# Patient Record
Sex: FEMALE | Race: WHITE | NOT HISPANIC OR LATINO | Employment: FULL TIME | URBAN - METROPOLITAN AREA
[De-identification: names, ages, dates, MRNs, and addresses within clinical notes are randomized per-mention and may not be internally consistent; named-entity substitution may affect disease eponyms.]

---

## 2019-12-03 ENCOUNTER — INITIAL PRENATAL (OUTPATIENT)
Dept: OBSTETRICS AND GYNECOLOGY | Facility: CLINIC | Age: 29
End: 2019-12-03

## 2019-12-03 VITALS
WEIGHT: 177 LBS | BODY MASS INDEX: 31.36 KG/M2 | DIASTOLIC BLOOD PRESSURE: 70 MMHG | HEIGHT: 63 IN | SYSTOLIC BLOOD PRESSURE: 110 MMHG

## 2019-12-03 DIAGNOSIS — Z34.90 EARLY STAGE OF PREGNANCY: Primary | ICD-10-CM

## 2019-12-03 PROCEDURE — 81025 URINE PREGNANCY TEST: CPT | Performed by: OBSTETRICS & GYNECOLOGY

## 2019-12-03 PROCEDURE — 0501F PRENATAL FLOW SHEET: CPT | Performed by: OBSTETRICS & GYNECOLOGY

## 2019-12-03 RX ORDER — PROMETHAZINE HYDROCHLORIDE 25 MG/1
25 TABLET ORAL EVERY 6 HOURS PRN
Qty: 30 TABLET | Refills: 0 | Status: SHIPPED | OUTPATIENT
Start: 2019-12-03 | End: 2020-01-07

## 2019-12-03 RX ORDER — FERROUS SULFATE 325(65) MG
325 TABLET ORAL
Qty: 30 TABLET | Refills: 6 | Status: SHIPPED | OUTPATIENT
Start: 2019-12-03

## 2019-12-03 NOTE — PROGRESS NOTES
CC:  Initial ob visit  Patient presents today for initial OB visit.  She states she is sure regarding her LMP.  Patient does report nausea and vomiting and states she had issues with nausea and vomiting and 2 of her previous pregnancies.  Prescription was sent for Phenergan and iron pills.  She does report a history of anemia.  She is taking a prenatal vitamin.  History was reviewed with her and she is had 4 previous term vaginal deliveries and denies any complications with those pregnancies.  She reports that her youngest has sickle cell trait and states she is unsure if she is ever been screened for sickle cell.  Initial OB counseling was done.  Discussed delivering hospital and call system.  A/P:  Supervision of early pregnancy  --OB labs ordered  --We will schedule ultrasound after 7 weeks  --Follow-up with me in 4 weeks

## 2019-12-04 LAB
ABO GROUP BLD: (no result)
BASOPHILS # BLD AUTO: 0 X10E3/UL (ref 0–0.2)
BASOPHILS NFR BLD AUTO: 1 %
BLD GP AB SCN SERPL QL: NEGATIVE
EOSINOPHIL # BLD AUTO: 0.1 X10E3/UL (ref 0–0.4)
EOSINOPHIL NFR BLD AUTO: 1 %
ERYTHROCYTE [DISTWIDTH] IN BLOOD BY AUTOMATED COUNT: 13.4 % (ref 12.3–15.4)
HBV SURFACE AG SERPL QL IA: NEGATIVE
HCT VFR BLD AUTO: 35.7 % (ref 34–46.6)
HCV AB S/CO SERPL IA: 0.1 S/CO RATIO (ref 0–0.9)
HGB A MFR BLD: 97.7 % (ref 96.4–98.8)
HGB A2 MFR BLD COLUMN CHROM: 2.3 % (ref 1.8–3.2)
HGB BLD-MCNC: 11.7 G/DL (ref 11.1–15.9)
HGB C MFR BLD: 0 %
HGB F MFR BLD: 0 % (ref 0–2)
HGB FRACT BLD-IMP: NORMAL
HGB S BLD QL SOLY: NEGATIVE
HGB S MFR BLD: 0 %
HIV 1+2 AB+HIV1 P24 AG SERPL QL IA: NON REACTIVE
IMM GRANULOCYTES # BLD AUTO: 0 X10E3/UL (ref 0–0.1)
IMM GRANULOCYTES NFR BLD AUTO: 0 %
LYMPHOCYTES # BLD AUTO: 1.8 X10E3/UL (ref 0.7–3.1)
LYMPHOCYTES NFR BLD AUTO: 21 %
MCH RBC QN AUTO: 25.7 PG (ref 26.6–33)
MCHC RBC AUTO-ENTMCNC: 32.8 G/DL (ref 31.5–35.7)
MCV RBC AUTO: 79 FL (ref 79–97)
MONOCYTES # BLD AUTO: 0.6 X10E3/UL (ref 0.1–0.9)
MONOCYTES NFR BLD AUTO: 7 %
NEUTROPHILS # BLD AUTO: 5.9 X10E3/UL (ref 1.4–7)
NEUTROPHILS NFR BLD AUTO: 70 %
PLATELET # BLD AUTO: 317 X10E3/UL (ref 150–450)
RBC # BLD AUTO: 4.55 X10E6/UL (ref 3.77–5.28)
RH BLD: POSITIVE
RPR SER QL: NON REACTIVE
RUBV IGG SERPL IA-ACNC: 8.69 INDEX
WBC # BLD AUTO: 8.4 X10E3/UL (ref 3.4–10.8)

## 2019-12-06 LAB
AMPHETAMINES UR QL SCN: NEGATIVE NG/ML
BACTERIA UR CULT: NO GROWTH
BACTERIA UR CULT: NORMAL
BARBITURATES UR QL SCN: NEGATIVE NG/ML
BENZODIAZ UR QL: NEGATIVE NG/ML
BZE UR QL: NEGATIVE NG/ML
CANNABINOIDS UR QL SCN: NEGATIVE NG/ML
METHADONE UR QL SCN: NEGATIVE NG/ML
OPIATES UR QL: NEGATIVE NG/ML
PCP UR QL: NEGATIVE NG/ML
PROPOXYPH UR QL SCN: NEGATIVE NG/ML

## 2019-12-07 LAB
C TRACH RRNA CVX QL NAA+PROBE: NEGATIVE
CONV .: NORMAL
CYTOLOGIST CVX/VAG CYTO: NORMAL
CYTOLOGY CVX/VAG DOC CYTO: NORMAL
CYTOLOGY CVX/VAG DOC THIN PREP: NORMAL
DX ICD CODE: NORMAL
HIV 1 & 2 AB SER-IMP: NORMAL
N GONORRHOEA RRNA CVX QL NAA+PROBE: NEGATIVE
OTHER STN SPEC: NORMAL
STAT OF ADQ CVX/VAG CYTO-IMP: NORMAL
T VAGINALIS RRNA SPEC QL NAA+PROBE: NEGATIVE

## 2019-12-18 ENCOUNTER — PROCEDURE VISIT (OUTPATIENT)
Dept: OBSTETRICS AND GYNECOLOGY | Facility: CLINIC | Age: 29
End: 2019-12-18

## 2019-12-18 DIAGNOSIS — O36.80X0 ENCOUNTER TO DETERMINE FETAL VIABILITY OF PREGNANCY, SINGLE OR UNSPECIFIED FETUS: Primary | ICD-10-CM

## 2019-12-18 PROCEDURE — 76817 TRANSVAGINAL US OBSTETRIC: CPT | Performed by: OBSTETRICS & GYNECOLOGY

## 2020-01-07 ENCOUNTER — ROUTINE PRENATAL (OUTPATIENT)
Dept: OBSTETRICS AND GYNECOLOGY | Facility: CLINIC | Age: 30
End: 2020-01-07

## 2020-01-07 VITALS — BODY MASS INDEX: 31 KG/M2 | WEIGHT: 175 LBS | DIASTOLIC BLOOD PRESSURE: 81 MMHG | SYSTOLIC BLOOD PRESSURE: 122 MMHG

## 2020-01-07 DIAGNOSIS — Z34.81 ENCOUNTER FOR SUPERVISION OF OTHER NORMAL PREGNANCY IN FIRST TRIMESTER: Primary | ICD-10-CM

## 2020-01-07 LAB
GLUCOSE UR STRIP-MCNC: NEGATIVE MG/DL
PROT UR STRIP-MCNC: ABNORMAL MG/DL

## 2020-01-07 PROCEDURE — 0502F SUBSEQUENT PRENATAL CARE: CPT | Performed by: OBSTETRICS & GYNECOLOGY

## 2020-01-07 RX ORDER — ONDANSETRON 4 MG/1
4 TABLET, FILM COATED ORAL EVERY 6 HOURS PRN
Qty: 30 TABLET | Refills: 1 | Status: SHIPPED | OUTPATIENT
Start: 2020-01-07 | End: 2020-07-16 | Stop reason: HOSPADM

## 2020-01-07 NOTE — PROGRESS NOTES
CC:   Pregnancy  Patient reports continued nausea and does not like how the Phenergan makes her feel.  Discussed trying over-the-counter Unisom with B6.  A prescription for Zofran was also sent for her to try.  Her weight has remained stable.  Initial OB labs were reviewed with the patient along with change in RICHI based on her ultrasound.  A/P:  Supervision of normal pregnancy at 11 weeks with nausea and vomiting  --Prescription was sent for Zofran and also discussed that she may try Unisom and vitamin B6  --Follow-up in 4 weeks

## 2020-02-10 LAB
B-HCG UR QL: POSITIVE
INTERNAL NEGATIVE CONTROL: NEGATIVE
INTERNAL POSITIVE CONTROL: POSITIVE
Lab: ABNORMAL

## 2020-02-14 ENCOUNTER — ROUTINE PRENATAL (OUTPATIENT)
Dept: OBSTETRICS AND GYNECOLOGY | Facility: CLINIC | Age: 30
End: 2020-02-14

## 2020-02-14 VITALS — SYSTOLIC BLOOD PRESSURE: 122 MMHG | DIASTOLIC BLOOD PRESSURE: 83 MMHG | WEIGHT: 182 LBS | BODY MASS INDEX: 32.24 KG/M2

## 2020-02-14 DIAGNOSIS — Z34.82 ENCOUNTER FOR SUPERVISION OF OTHER NORMAL PREGNANCY IN SECOND TRIMESTER: Primary | ICD-10-CM

## 2020-02-14 DIAGNOSIS — Z3A.16 16 WEEKS GESTATION OF PREGNANCY: ICD-10-CM

## 2020-02-14 LAB
GLUCOSE UR STRIP-MCNC: NEGATIVE MG/DL
PROT UR STRIP-MCNC: ABNORMAL MG/DL

## 2020-02-14 PROCEDURE — 0502F SUBSEQUENT PRENATAL CARE: CPT | Performed by: OBSTETRICS & GYNECOLOGY

## 2020-02-14 NOTE — PROGRESS NOTES
CC:  Pregnancy  Patient has no major complaints.  She states nausea and vomiting have resolved.  She is starting to feel some flutters.  Discussed second trimester genetic screening and she desires.  Discussed anatomy ultrasound in 3 weeks.  A/P: Supervision of pregnancy at 16 weeks  --Second trimester genetic screening ordered  --Follow-up in 3 weeks with anatomy ultrasound

## 2020-02-17 LAB
2ND TRIMESTER 4 SCREEN SERPL-IMP: NORMAL
2ND TRIMESTER 4 SCREEN SERPL-IMP: NORMAL
AFP ADJ MOM SERPL: 0.97
AFP SERPL-MCNC: 31.2 NG/ML
AGE AT DELIVERY: 30.1 YR
FET TS 18 RISK FROM MAT AGE: NORMAL
FET TS 21 RISK FROM MAT AGE: 690
GA METHOD: NORMAL
GA: 16.7 WEEKS
HCG ADJ MOM SERPL: 1.23
HCG SERPL-ACNC: NORMAL MIU/ML
IDDM PATIENT QL: NO
INHIBIN A ADJ MOM SERPL: 0.82
INHIBIN A SERPL-MCNC: 120.22 PG/ML
LABORATORY COMMENT REPORT: NORMAL
MULTIPLE PREGNANCY: NO
NEURAL TUBE DEFECT RISK FETUS: NORMAL %
RESULT: NORMAL
TS 18 RISK FETUS: NORMAL
TS 21 RISK FETUS: NORMAL
U ESTRIOL ADJ MOM SERPL: 1.53
U ESTRIOL SERPL-MCNC: 1.5 NG/ML

## 2020-02-18 ENCOUNTER — TELEPHONE (OUTPATIENT)
Dept: OBSTETRICS AND GYNECOLOGY | Facility: CLINIC | Age: 30
End: 2020-02-18

## 2020-02-18 NOTE — TELEPHONE ENCOUNTER
----- Message from Penelope Pate MD sent at 2/18/2020  7:39 AM EST -----  Please let patient know that her genetic screen was normal

## 2020-03-06 ENCOUNTER — ROUTINE PRENATAL (OUTPATIENT)
Dept: OBSTETRICS AND GYNECOLOGY | Facility: CLINIC | Age: 30
End: 2020-03-06

## 2020-03-06 ENCOUNTER — PROCEDURE VISIT (OUTPATIENT)
Dept: OBSTETRICS AND GYNECOLOGY | Facility: CLINIC | Age: 30
End: 2020-03-06

## 2020-03-06 VITALS — BODY MASS INDEX: 33.3 KG/M2 | DIASTOLIC BLOOD PRESSURE: 77 MMHG | WEIGHT: 188 LBS | SYSTOLIC BLOOD PRESSURE: 134 MMHG

## 2020-03-06 DIAGNOSIS — Z34.82 ENCOUNTER FOR SUPERVISION OF OTHER NORMAL PREGNANCY IN SECOND TRIMESTER: Primary | ICD-10-CM

## 2020-03-06 DIAGNOSIS — Z36.89 ENCOUNTER FOR FETAL ANATOMIC SURVEY: Primary | ICD-10-CM

## 2020-03-06 LAB
GLUCOSE UR STRIP-MCNC: NEGATIVE MG/DL
PROT UR STRIP-MCNC: ABNORMAL MG/DL

## 2020-03-06 PROCEDURE — 76805 OB US >/= 14 WKS SNGL FETUS: CPT | Performed by: OBSTETRICS & GYNECOLOGY

## 2020-03-06 PROCEDURE — 0502F SUBSEQUENT PRENATAL CARE: CPT | Performed by: OBSTETRICS & GYNECOLOGY

## 2020-03-06 NOTE — PROGRESS NOTES
CC:  Pregnancy  Patient has no major complaints.  She has been doing well.  Anatomy ultrasound was performed today and appears normal.  Ultrasound was reviewed with her.  A/P:  Supervision of pregnancy at 19 weeks  --Followup in 4 weeks

## 2020-03-16 ENCOUNTER — TELEPHONE (OUTPATIENT)
Dept: OBSTETRICS AND GYNECOLOGY | Facility: CLINIC | Age: 30
End: 2020-03-16

## 2020-03-16 NOTE — TELEPHONE ENCOUNTER
She should try her best to keep as much distance as able from the riders.  She should continue to wash her hands very frequently.  At this time, pregnant women are not thought to be at higher risk in regards to coronavirus.

## 2020-03-16 NOTE — TELEPHONE ENCOUNTER
Patient called and stated she is a [employer]  and is concerned because public transportation is not being shut down and she is pregnant. She doesn't know what she should do to best protect her self besides washing her hands.

## 2020-03-27 ENCOUNTER — TELEPHONE (OUTPATIENT)
Dept: OBSTETRICS AND GYNECOLOGY | Facility: CLINIC | Age: 30
End: 2020-03-27

## 2020-03-27 NOTE — TELEPHONE ENCOUNTER
I have sent in a prescription for terconazole to treat yeast infection.  If symptoms do not improve, she would need to be seen.

## 2020-03-27 NOTE — TELEPHONE ENCOUNTER
Good Morning,    Patient called and stated she doesn't know if she starting to get a yeast infection but states she is extremely swollen and it is painful. She doesn't have any discharge, doesn't hurt when she urinates. States the pain and swelling started on 3/25/2020.      # [phone]

## 2020-04-10 ENCOUNTER — ROUTINE PRENATAL (OUTPATIENT)
Dept: OBSTETRICS AND GYNECOLOGY | Facility: CLINIC | Age: 30
End: 2020-04-10

## 2020-04-10 VITALS — WEIGHT: 189 LBS | SYSTOLIC BLOOD PRESSURE: 123 MMHG | BODY MASS INDEX: 33.48 KG/M2 | DIASTOLIC BLOOD PRESSURE: 79 MMHG

## 2020-04-10 DIAGNOSIS — Z34.82 ENCOUNTER FOR SUPERVISION OF OTHER NORMAL PREGNANCY IN SECOND TRIMESTER: Primary | ICD-10-CM

## 2020-04-10 LAB
GLUCOSE UR STRIP-MCNC: NEGATIVE MG/DL
PROT UR STRIP-MCNC: NEGATIVE MG/DL

## 2020-04-10 PROCEDURE — 0502F SUBSEQUENT PRENATAL CARE: CPT | Performed by: OBSTETRICS & GYNECOLOGY

## 2020-04-10 NOTE — PROGRESS NOTES
CC:  Pregnancy  Patient has no complaints.  She reports lots of fetal movement.  Patient is currently laid off work and is at home.  Discussed 1 hour glucose test at her next visit and instructions given.  A/P: Supervision of pregnancy at 24 weeks  --Follow-up in 4 weeks

## 2020-04-13 ENCOUNTER — TELEPHONE (OUTPATIENT)
Dept: OBSTETRICS AND GYNECOLOGY | Facility: CLINIC | Age: 30
End: 2020-04-13

## 2020-04-13 NOTE — TELEPHONE ENCOUNTER
Patient has been experiencing vaginal irritation for a few weeks now. She was taking the medication perscribed but didn't like the way she was reacting to it so she stopped. Patients vaginal area is still very irritated. Should I schedule her to be seen?

## 2020-04-15 ENCOUNTER — ROUTINE PRENATAL (OUTPATIENT)
Dept: OBSTETRICS AND GYNECOLOGY | Facility: CLINIC | Age: 30
End: 2020-04-15

## 2020-04-15 VITALS — DIASTOLIC BLOOD PRESSURE: 87 MMHG | BODY MASS INDEX: 32.95 KG/M2 | SYSTOLIC BLOOD PRESSURE: 129 MMHG | WEIGHT: 186 LBS

## 2020-04-15 DIAGNOSIS — N89.8 VAGINAL DISCHARGE DURING PREGNANCY IN SECOND TRIMESTER: Primary | ICD-10-CM

## 2020-04-15 DIAGNOSIS — Z3A.25 25 WEEKS GESTATION OF PREGNANCY: ICD-10-CM

## 2020-04-15 DIAGNOSIS — O26.892 VAGINAL DISCHARGE DURING PREGNANCY IN SECOND TRIMESTER: Primary | ICD-10-CM

## 2020-04-15 LAB
GLUCOSE UR STRIP-MCNC: NEGATIVE MG/DL
PROT UR STRIP-MCNC: ABNORMAL MG/DL

## 2020-04-15 PROCEDURE — 0502F SUBSEQUENT PRENATAL CARE: CPT | Performed by: OBSTETRICS & GYNECOLOGY

## 2020-04-15 NOTE — PROGRESS NOTES
Subjective   Ada Anderson is a 29 y.o. female CC:  Vaginal discharge    History of Present Illness  Patient is a 29-year-old -0-0-3 who is 25 weeks 3 days who complains of vaginal discharge with associated irritation that has been ongoing for 2 weeks.  She did try terconazole and states that symptoms worsened.  She denies any new soaps or detergents.  She denies any foul odor to the discharge.  She denies any concern for STDs.  She reports good fetal movement and has no pregnancy related complaints.    The following portions of the patient's history were reviewed and updated as appropriate: allergies, current medications, past family history, past medical history, past social history, past surgical history and problem list.    Review of Systems  Review of Systems - Negative except vaginal discharge and irritation    Objective   Physical Exam  Physical Examination: General appearance - alert, well appearing, and in no distress  Abdomen - gravid, nontender, FHT 140s  Pelvic - VULVA: normal appearing vulva with no masses, tenderness or lesions, VAGINA: vaginal discharge - white, copious, creamy and odorless, CERVIX: normal appearing cervix without discharge or lesions  Extremities - peripheral pulses normal, no pedal edema, no clubbing or cyanosis      Assessment/Plan   Diagnoses and all orders for this visit:    Vaginal discharge during pregnancy in second trimester  -     NuSwab VG+ - Swab, Vagina    25 weeks gestation of pregnancy    Vaginal culture was obtained.  We will await results of culture and treat based on results.  Patient has her next prenatal appointment scheduled.

## 2020-04-19 LAB
A VAGINAE DNA VAG QL NAA+PROBE: ABNORMAL SCORE
BVAB2 DNA VAG QL NAA+PROBE: ABNORMAL SCORE
C ALBICANS DNA VAG QL NAA+PROBE: POSITIVE
C GLABRATA DNA VAG QL NAA+PROBE: NEGATIVE
C TRACH DNA VAG QL NAA+PROBE: NEGATIVE
MEGA1 DNA VAG QL NAA+PROBE: ABNORMAL SCORE
N GONORRHOEA DNA VAG QL NAA+PROBE: NEGATIVE
T VAGINALIS DNA VAG QL NAA+PROBE: NEGATIVE

## 2020-04-20 ENCOUNTER — TELEPHONE (OUTPATIENT)
Dept: OBSTETRICS AND GYNECOLOGY | Facility: CLINIC | Age: 30
End: 2020-04-20

## 2020-04-20 RX ORDER — FLUCONAZOLE 150 MG/1
150 TABLET ORAL DAILY
Qty: 2 TABLET | Refills: 0 | Status: SHIPPED | OUTPATIENT
Start: 2020-04-20 | End: 2020-05-08

## 2020-04-20 NOTE — TELEPHONE ENCOUNTER
Patient would like the results of her labs and would also like to know if an RX will be send to her pharmacy for the infection

## 2020-04-20 NOTE — TELEPHONE ENCOUNTER
Please let patient know that her culture was positive for yeast only.  I have sent a prescription for Diflucan.  It is a one-time tablet.  If symptoms are not completely resolved in 72 hours, she can take a second dose.

## 2020-05-08 ENCOUNTER — ROUTINE PRENATAL (OUTPATIENT)
Dept: OBSTETRICS AND GYNECOLOGY | Facility: CLINIC | Age: 30
End: 2020-05-08

## 2020-05-08 VITALS — WEIGHT: 190.2 LBS | SYSTOLIC BLOOD PRESSURE: 111 MMHG | DIASTOLIC BLOOD PRESSURE: 79 MMHG | BODY MASS INDEX: 33.69 KG/M2

## 2020-05-08 DIAGNOSIS — Z34.83 ENCOUNTER FOR SUPERVISION OF OTHER NORMAL PREGNANCY IN THIRD TRIMESTER: Primary | ICD-10-CM

## 2020-05-08 LAB
GLUCOSE UR STRIP-MCNC: NEGATIVE MG/DL
PROT UR STRIP-MCNC: ABNORMAL MG/DL

## 2020-05-08 PROCEDURE — 0502F SUBSEQUENT PRENATAL CARE: CPT | Performed by: OBSTETRICS & GYNECOLOGY

## 2020-05-08 NOTE — PROGRESS NOTES
Chief Complaint   Patient presents with   • Routine Prenatal Visit     HPI- Pt is 29 y.o.  at 28w5d here for prenatal visit.  Patient has no major complaints.  She reports adequate fetal movement.    ROS-     - No vaginal bleeding    GI- No abdominal pain    /79   Wt 86.3 kg (190 lb 3.2 oz)   LMP 10/26/2019   BMI 33.69 kg/m²   Exam - See flow sheet    Fetal heart rate is normal    Assessment-  Diagnoses and all orders for this visit:    Encounter for supervision of other normal pregnancy in third trimester    Other orders  -     POC Urinalysis Dipstick      Patient is doing her 1 hour glucose test and CBC today.  Discussed fetal kick counts in the last trimester and maternal wellbeing.  She will follow-up in 2 weeks.

## 2020-05-09 LAB
ERYTHROCYTE [DISTWIDTH] IN BLOOD BY AUTOMATED COUNT: 13.7 % (ref 12.3–15.4)
GLUCOSE 1H P 50 G GLC PO SERPL-MCNC: 173 MG/DL (ref 65–179)
HCT VFR BLD AUTO: 32.1 % (ref 34–46.6)
HGB BLD-MCNC: 10.3 G/DL (ref 12–15.9)
MCH RBC QN AUTO: 25.7 PG (ref 26.6–33)
MCHC RBC AUTO-ENTMCNC: 32.1 G/DL (ref 31.5–35.7)
MCV RBC AUTO: 80 FL (ref 79–97)
PLATELET # BLD AUTO: 196 10*3/MM3 (ref 140–450)
RBC # BLD AUTO: 4.01 10*6/MM3 (ref 3.77–5.28)
WBC # BLD AUTO: 7.89 10*3/MM3 (ref 3.4–10.8)

## 2020-05-11 ENCOUNTER — TELEPHONE (OUTPATIENT)
Dept: OBSTETRICS AND GYNECOLOGY | Facility: CLINIC | Age: 30
End: 2020-05-11

## 2020-05-11 DIAGNOSIS — R73.09 IMPAIRED GLUCOSE TOLERANCE TEST: Primary | ICD-10-CM

## 2020-05-11 PROBLEM — O99.019 MATERNAL ANEMIA IN PREGNANCY, ANTEPARTUM: Status: ACTIVE | Noted: 2020-05-11

## 2020-05-11 NOTE — TELEPHONE ENCOUNTER
----- Message from Penelope Pate MD sent at 5/11/2020  9:00 AM EDT -----  Please let patient know that she failed her 1 hour glucose test and needs to return to do her 3-hour test.  Her CBC also indicates she is anemic.  Her chart indicates that she is taking iron.  Can you please make sure that she is taking it?

## 2020-05-11 NOTE — TELEPHONE ENCOUNTER
Pt aware of failed one hour and will return for 3 hour.  Advised patient to take her iron as directed due to anemia shown on labs.

## 2020-05-22 ENCOUNTER — ROUTINE PRENATAL (OUTPATIENT)
Dept: OBSTETRICS AND GYNECOLOGY | Facility: CLINIC | Age: 30
End: 2020-05-22

## 2020-05-22 VITALS — SYSTOLIC BLOOD PRESSURE: 124 MMHG | WEIGHT: 193 LBS | BODY MASS INDEX: 34.19 KG/M2 | DIASTOLIC BLOOD PRESSURE: 81 MMHG

## 2020-05-22 DIAGNOSIS — Z34.83 ENCOUNTER FOR SUPERVISION OF OTHER NORMAL PREGNANCY IN THIRD TRIMESTER: Primary | ICD-10-CM

## 2020-05-22 LAB
GLUCOSE UR STRIP-MCNC: NEGATIVE MG/DL
PROT UR STRIP-MCNC: ABNORMAL MG/DL

## 2020-05-22 PROCEDURE — 0502F SUBSEQUENT PRENATAL CARE: CPT | Performed by: OBSTETRICS & GYNECOLOGY

## 2020-05-22 NOTE — PROGRESS NOTES
Chief Complaint   Patient presents with   • Routine Prenatal Visit     HPI- Pt is 30 y.o.  at 30w5d here for prenatal visit.  Patient overall feels well.  She complains of some mild swelling.  She reports adequate fetal movement.    ROS-     - No vaginal bleeding    GI- No abdominal pain    /81   Wt 87.5 kg (193 lb)   LMP 10/26/2019   BMI 34.19 kg/m²   Exam - See flow sheet    Fetal heart rate is normal    Assessment-  Diagnoses and all orders for this visit:    Encounter for supervision of other normal pregnancy in third trimester    Other orders  -     POC Urinalysis Dipstick    Patient is doing her 3-hour glucose test today.  She will follow-up in 3 weeks with growth ultrasound due to maternal anemia.

## 2020-05-23 LAB
GLUCOSE 1H P 100 G GLC PO SERPL-MCNC: 172 MG/DL (ref 65–179)
GLUCOSE 2H P 100 G GLC PO SERPL-MCNC: 157 MG/DL (ref 65–154)
GLUCOSE 3H P 100 G GLC PO SERPL-MCNC: 125 MG/DL (ref 65–139)
GLUCOSE P FAST SERPL-MCNC: 93 MG/DL (ref 65–94)

## 2020-05-27 ENCOUNTER — TELEPHONE (OUTPATIENT)
Dept: OBSTETRICS AND GYNECOLOGY | Facility: CLINIC | Age: 30
End: 2020-05-27

## 2020-05-27 NOTE — TELEPHONE ENCOUNTER
Ada called this morning and stated that she is feeling a lot of pressure in her vaginal area and her bottom.      Are there any home remedies she can try?

## 2020-06-10 ENCOUNTER — PROCEDURE VISIT (OUTPATIENT)
Dept: OBSTETRICS AND GYNECOLOGY | Facility: CLINIC | Age: 30
End: 2020-06-10

## 2020-06-10 ENCOUNTER — ROUTINE PRENATAL (OUTPATIENT)
Dept: OBSTETRICS AND GYNECOLOGY | Facility: CLINIC | Age: 30
End: 2020-06-10

## 2020-06-10 VITALS — SYSTOLIC BLOOD PRESSURE: 130 MMHG | WEIGHT: 199 LBS | BODY MASS INDEX: 35.25 KG/M2 | DIASTOLIC BLOOD PRESSURE: 83 MMHG

## 2020-06-10 DIAGNOSIS — Z36.89 ENCOUNTER FOR ULTRASOUND TO CHECK FETAL GROWTH: Primary | ICD-10-CM

## 2020-06-10 DIAGNOSIS — Z34.83 ENCOUNTER FOR SUPERVISION OF OTHER NORMAL PREGNANCY IN THIRD TRIMESTER: Primary | ICD-10-CM

## 2020-06-10 DIAGNOSIS — O99.019 MATERNAL ANEMIA IN PREGNANCY, ANTEPARTUM: ICD-10-CM

## 2020-06-10 LAB
GLUCOSE UR STRIP-MCNC: NEGATIVE MG/DL
PROT UR STRIP-MCNC: ABNORMAL MG/DL

## 2020-06-10 PROCEDURE — 76816 OB US FOLLOW-UP PER FETUS: CPT | Performed by: OBSTETRICS & GYNECOLOGY

## 2020-06-10 PROCEDURE — 0502F SUBSEQUENT PRENATAL CARE: CPT | Performed by: OBSTETRICS & GYNECOLOGY

## 2020-06-10 NOTE — PROGRESS NOTES
No chief complaint on file.    HPI- Pt is 30 y.o.  at 33w3d here for prenatal visit.  Patient reports intense pelvic pressure and lower back pain.  She is requesting to be checked.  She denies any painful contractions, leaking, or bleeding.  She reports adequate fetal movement.    ROS-     - No vaginal bleeding    GI- No abdominal pain    /83   Wt 90.3 kg (199 lb)   LMP 10/26/2019   BMI 35.25 kg/m²   Exam - See flow sheet    Fetal heart rate is normal    Assessment-  Diagnoses and all orders for this visit:    Encounter for supervision of other normal pregnancy in third trimester    Other orders  -     POC Urinalysis Dipstick    Patient cervix was closed on exam.   labor precautions were reviewed.  Patient is using a belly support band.  Growth ultrasound was performed today and fetus shows appropriate fetal growth.  Ultrasound was reviewed with her.  She will follow-up in 2 weeks.

## 2020-06-24 ENCOUNTER — ROUTINE PRENATAL (OUTPATIENT)
Dept: OBSTETRICS AND GYNECOLOGY | Facility: CLINIC | Age: 30
End: 2020-06-24

## 2020-06-24 VITALS — SYSTOLIC BLOOD PRESSURE: 126 MMHG | WEIGHT: 201 LBS | DIASTOLIC BLOOD PRESSURE: 84 MMHG | BODY MASS INDEX: 35.61 KG/M2

## 2020-06-24 DIAGNOSIS — Z34.83 ENCOUNTER FOR SUPERVISION OF OTHER NORMAL PREGNANCY IN THIRD TRIMESTER: Primary | ICD-10-CM

## 2020-06-24 LAB
GLUCOSE UR STRIP-MCNC: NEGATIVE MG/DL
PROT UR STRIP-MCNC: ABNORMAL MG/DL

## 2020-06-24 PROCEDURE — 0502F SUBSEQUENT PRENATAL CARE: CPT | Performed by: OBSTETRICS & GYNECOLOGY

## 2020-06-24 NOTE — PROGRESS NOTES
Chief Complaint   Patient presents with   • Routine Prenatal Visit     HPI- Pt is 30 y.o.  at 35w3d here for prenatal visit.  She has no complaints today.  She reports adequate fetal movement.    ROS-     - No vaginal bleeding    GI- No abdominal pain    /84   Wt 91.2 kg (201 lb)   LMP 10/26/2019   BMI 35.61 kg/m²   Exam - See flow sheet    Fetal heart rate is normal    Assessment-  Diagnoses and all orders for this visit:    Encounter for supervision of other normal pregnancy in third trimester  -     Group B Streptococcus Culture - Swab, Vaginal/Rectum    Other orders  -     POC Urinalysis Dipstick    GBS culture was obtained today.  Labor precautions were reviewed with the patient and discussed when to go to labor and delivery.  She will follow-up in 1 week.

## 2020-06-28 PROBLEM — B95.1 POSITIVE GBS TEST: Status: ACTIVE | Noted: 2020-06-28

## 2020-06-28 LAB — B-HEM STREP SPEC QL CULT: POSITIVE

## 2020-07-01 ENCOUNTER — ROUTINE PRENATAL (OUTPATIENT)
Dept: OBSTETRICS AND GYNECOLOGY | Facility: CLINIC | Age: 30
End: 2020-07-01

## 2020-07-01 VITALS — SYSTOLIC BLOOD PRESSURE: 131 MMHG | WEIGHT: 205 LBS | DIASTOLIC BLOOD PRESSURE: 90 MMHG | BODY MASS INDEX: 36.31 KG/M2

## 2020-07-01 DIAGNOSIS — Z3A.36 36 WEEKS GESTATION OF PREGNANCY: ICD-10-CM

## 2020-07-01 DIAGNOSIS — O16.3 ELEVATED BLOOD PRESSURE AFFECTING PREGNANCY IN THIRD TRIMESTER, ANTEPARTUM: Primary | ICD-10-CM

## 2020-07-01 LAB
GLUCOSE UR STRIP-MCNC: NEGATIVE MG/DL
PROT UR STRIP-MCNC: ABNORMAL MG/DL

## 2020-07-01 PROCEDURE — 0502F SUBSEQUENT PRENATAL CARE: CPT | Performed by: OBSTETRICS & GYNECOLOGY

## 2020-07-01 NOTE — PROGRESS NOTES
"Chief Complaint   Patient presents with   • Routine Prenatal Visit     HPI- Pt is 30 y.o.  at 36w3d here for prenatal visit.  Patient reports adequate fetal movement.  She complains of swelling, but denies it has worsened since last week.  She does complain of a \"stretching\" pain on her upper abdomen that she believes is from her skin stretching.  She denies any headaches or vision changes.    ROS-     - No vaginal bleeding    GI- No abdominal pain    /90   Wt 93 kg (205 lb)   LMP 10/26/2019   BMI 36.31 kg/m²   Exam - See flow sheet    Fetal heart rate is normal    Assessment-  Diagnoses and all orders for this visit:    Elevated blood pressure affecting pregnancy in third trimester, antepartum  -     CBC (No Diff)  -     Comprehensive Metabolic Panel  -     Protein / Creatinine Ratio, Urine - Urine, Clean Catch    36 weeks gestation of pregnancy    Other orders  -     POC Urinalysis Dipstick    Patient had 2 mildly elevated diastolic blood pressures.  She denies any symptoms consistent with preeclampsia.  Patient did not have high blood pressure issues with any of her previous pregnancies.  We will check labs today along with protein to creatinine ratio.  Preeclamptic warning signs were reviewed with her and discussed when to go to the hospital.  Reviewed positive GBS culture and need for penicillin in labor.  She will follow-up in 1 week.      "

## 2020-07-02 ENCOUNTER — TELEPHONE (OUTPATIENT)
Dept: OBSTETRICS AND GYNECOLOGY | Facility: CLINIC | Age: 30
End: 2020-07-02

## 2020-07-02 LAB
ALBUMIN SERPL-MCNC: 3.3 G/DL (ref 3.5–5.2)
ALBUMIN/GLOB SERPL: 1.2 G/DL
ALP SERPL-CCNC: 113 U/L (ref 39–117)
ALT SERPL-CCNC: 7 U/L (ref 1–33)
AST SERPL-CCNC: 11 U/L (ref 1–32)
BILIRUB SERPL-MCNC: 0.2 MG/DL (ref 0.2–1.2)
BUN SERPL-MCNC: 6 MG/DL (ref 6–20)
BUN/CREAT SERPL: 10.5 (ref 7–25)
CALCIUM SERPL-MCNC: 8.7 MG/DL (ref 8.6–10.5)
CHLORIDE SERPL-SCNC: 105 MMOL/L (ref 98–107)
CO2 SERPL-SCNC: 18.8 MMOL/L (ref 22–29)
CREAT SERPL-MCNC: 0.57 MG/DL (ref 0.57–1)
CREAT UR-MCNC: 156.8 MG/DL
ERYTHROCYTE [DISTWIDTH] IN BLOOD BY AUTOMATED COUNT: 15.5 % (ref 12.3–15.4)
GLOBULIN SER CALC-MCNC: 2.8 GM/DL
GLUCOSE SERPL-MCNC: 118 MG/DL (ref 65–99)
HCT VFR BLD AUTO: 32.1 % (ref 34–46.6)
HGB BLD-MCNC: 10.5 G/DL (ref 12–15.9)
MCH RBC QN AUTO: 26.4 PG (ref 26.6–33)
MCHC RBC AUTO-ENTMCNC: 32.7 G/DL (ref 31.5–35.7)
MCV RBC AUTO: 80.7 FL (ref 79–97)
PLATELET # BLD AUTO: 171 10*3/MM3 (ref 140–450)
POTASSIUM SERPL-SCNC: 4.1 MMOL/L (ref 3.5–5.2)
PROT SERPL-MCNC: 6.1 G/DL (ref 6–8.5)
PROT UR-MCNC: 21.3 MG/DL
PROT/CREAT UR: 135.8 MG/G CREA (ref 0–200)
RBC # BLD AUTO: 3.98 10*6/MM3 (ref 3.77–5.28)
SODIUM SERPL-SCNC: 136 MMOL/L (ref 136–145)
WBC # BLD AUTO: 5.94 10*3/MM3 (ref 3.4–10.8)

## 2020-07-02 NOTE — TELEPHONE ENCOUNTER
----- Message from Penelope Pate MD sent at 7/2/2020 11:08 AM EDT -----  Let patient know her labs from yesterday were normal

## 2020-07-08 ENCOUNTER — ROUTINE PRENATAL (OUTPATIENT)
Dept: OBSTETRICS AND GYNECOLOGY | Facility: CLINIC | Age: 30
End: 2020-07-08

## 2020-07-08 VITALS — SYSTOLIC BLOOD PRESSURE: 126 MMHG | DIASTOLIC BLOOD PRESSURE: 85 MMHG

## 2020-07-08 DIAGNOSIS — Z3A.37 37 WEEKS GESTATION OF PREGNANCY: ICD-10-CM

## 2020-07-08 DIAGNOSIS — O13.3 GESTATIONAL HYPERTENSION WITHOUT SIGNIFICANT PROTEINURIA IN THIRD TRIMESTER: Primary | ICD-10-CM

## 2020-07-08 LAB
GLUCOSE UR STRIP-MCNC: NEGATIVE MG/DL
PROT UR STRIP-MCNC: ABNORMAL MG/DL

## 2020-07-08 PROCEDURE — 0502F SUBSEQUENT PRENATAL CARE: CPT | Performed by: OBSTETRICS & GYNECOLOGY

## 2020-07-08 NOTE — PROGRESS NOTES
Chief Complaint   Patient presents with   • Routine Prenatal Visit     HPI- Pt is 30 y.o.  at 37w3d here for prenatal visit.  Patient complains of pelvic pressure.  She reports adequate fetal movement.  Patient denies headaches, vision changes, or right upper quadrant pain.    ROS-     - No vaginal bleeding    GI- No abdominal pain    /85   LMP 10/26/2019   Exam - See flow sheet    Fetal heart rate is normal    Assessment-  Diagnoses and all orders for this visit:    Gestational hypertension without significant proteinuria in third trimester  -     Labor Induction; Future    37 weeks gestation of pregnancy    Other orders  -     POC Urinalysis Dipstick      Initial blood pressure today was again mildly elevated and repeat was normal.  Work-up last week was negative for preeclampsia and she denies any symptoms.  Due to new diagnosis of gestational hypertension, discussed recommendations for delivery after 37 weeks.  We will schedule patient next Tuesday for Pitocin induction.  The induction process was explained to her.  Labor precautions and preeclamptic warning signs were reviewed with her.

## 2020-07-13 DIAGNOSIS — O13.3 GESTATIONAL HYPERTENSION, THIRD TRIMESTER: Primary | ICD-10-CM

## 2020-07-13 RX ORDER — ONDANSETRON 2 MG/ML
4 INJECTION INTRAMUSCULAR; INTRAVENOUS EVERY 6 HOURS PRN
Status: CANCELLED | OUTPATIENT
Start: 2020-07-13

## 2020-07-13 RX ORDER — MISOPROSTOL 100 UG/1
800 TABLET ORAL AS NEEDED
Status: CANCELLED | OUTPATIENT
Start: 2020-07-13

## 2020-07-13 RX ORDER — ACETAMINOPHEN 325 MG/1
650 TABLET ORAL EVERY 4 HOURS PRN
Status: CANCELLED | OUTPATIENT
Start: 2020-07-13

## 2020-07-13 RX ORDER — SODIUM CHLORIDE, SODIUM LACTATE, POTASSIUM CHLORIDE, CALCIUM CHLORIDE 600; 310; 30; 20 MG/100ML; MG/100ML; MG/100ML; MG/100ML
125 INJECTION, SOLUTION INTRAVENOUS CONTINUOUS
Status: CANCELLED | OUTPATIENT
Start: 2020-07-13

## 2020-07-13 RX ORDER — ONDANSETRON 4 MG/1
4 TABLET, FILM COATED ORAL EVERY 6 HOURS PRN
Status: CANCELLED | OUTPATIENT
Start: 2020-07-13

## 2020-07-13 RX ORDER — OXYTOCIN 10 [USP'U]/ML
999 INJECTION, SOLUTION INTRAMUSCULAR; INTRAVENOUS ONCE
Status: CANCELLED | OUTPATIENT
Start: 2020-07-13

## 2020-07-13 RX ORDER — CARBOPROST TROMETHAMINE 250 UG/ML
250 INJECTION, SOLUTION INTRAMUSCULAR AS NEEDED
Status: CANCELLED | OUTPATIENT
Start: 2020-07-13

## 2020-07-13 RX ORDER — SODIUM CHLORIDE 0.9 % (FLUSH) 0.9 %
10 SYRINGE (ML) INJECTION AS NEEDED
Status: CANCELLED | OUTPATIENT
Start: 2020-07-13

## 2020-07-13 RX ORDER — OXYTOCIN 10 [USP'U]/ML
250 INJECTION, SOLUTION INTRAMUSCULAR; INTRAVENOUS CONTINUOUS
Status: CANCELLED | OUTPATIENT
Start: 2020-07-13 | End: 2020-07-13

## 2020-07-13 RX ORDER — METHYLERGONOVINE MALEATE 0.2 MG/ML
200 INJECTION INTRAVENOUS ONCE AS NEEDED
Status: CANCELLED | OUTPATIENT
Start: 2020-07-13

## 2020-07-13 RX ORDER — OXYTOCIN 10 [USP'U]/ML
2-30 INJECTION, SOLUTION INTRAMUSCULAR; INTRAVENOUS
Status: CANCELLED | OUTPATIENT
Start: 2020-07-13

## 2020-07-13 RX ORDER — OXYTOCIN 10 [USP'U]/ML
125 INJECTION, SOLUTION INTRAMUSCULAR; INTRAVENOUS CONTINUOUS PRN
Status: CANCELLED | OUTPATIENT
Start: 2020-07-13

## 2020-07-13 RX ORDER — SODIUM CHLORIDE 0.9 % (FLUSH) 0.9 %
3 SYRINGE (ML) INJECTION EVERY 12 HOURS SCHEDULED
Status: CANCELLED | OUTPATIENT
Start: 2020-07-13

## 2020-07-13 RX ORDER — MAGNESIUM CARB/ALUMINUM HYDROX 105-160MG
30 TABLET,CHEWABLE ORAL ONCE
Status: CANCELLED | OUTPATIENT
Start: 2020-07-13 | End: 2020-07-13

## 2020-07-13 RX ORDER — LIDOCAINE HYDROCHLORIDE 10 MG/ML
5 INJECTION, SOLUTION EPIDURAL; INFILTRATION; INTRACAUDAL; PERINEURAL AS NEEDED
Status: CANCELLED | OUTPATIENT
Start: 2020-07-13

## 2020-07-14 ENCOUNTER — ANESTHESIA (OUTPATIENT)
Dept: LABOR AND DELIVERY | Facility: HOSPITAL | Age: 30
End: 2020-07-14

## 2020-07-14 ENCOUNTER — ANESTHESIA EVENT (OUTPATIENT)
Dept: LABOR AND DELIVERY | Facility: HOSPITAL | Age: 30
End: 2020-07-14

## 2020-07-14 ENCOUNTER — HOSPITAL ENCOUNTER (INPATIENT)
Facility: HOSPITAL | Age: 30
LOS: 2 days | Discharge: HOME | End: 2020-07-16
Attending: OBSTETRICS & GYNECOLOGY | Admitting: OBSTETRICS & GYNECOLOGY

## 2020-07-14 ENCOUNTER — HOSPITAL ENCOUNTER (OUTPATIENT)
Dept: LABOR AND DELIVERY | Facility: HOSPITAL | Age: 30
Discharge: HOME | End: 2020-07-14

## 2020-07-14 DIAGNOSIS — O13.3 GESTATIONAL HYPERTENSION, THIRD TRIMESTER: ICD-10-CM

## 2020-07-14 PROBLEM — O13.9 GESTATIONAL HYPERTENSION: Status: ACTIVE | Noted: 2020-07-14

## 2020-07-14 LAB
ABO GROUP BLD: NORMAL
ALBUMIN SERPL-MCNC: 3.5 G/DL (ref 3.5–5.2)
ALBUMIN/GLOB SERPL: 1 G/DL
ALP SERPL-CCNC: 137 U/L (ref 39–117)
ALT SERPL W P-5'-P-CCNC: 9 U/L (ref 1–33)
ANION GAP SERPL CALCULATED.3IONS-SCNC: 13.4 MMOL/L (ref 5–15)
AST SERPL-CCNC: 15 U/L (ref 1–32)
BASOPHILS # BLD AUTO: 0.02 10*3/MM3 (ref 0–0.2)
BASOPHILS NFR BLD AUTO: 0.3 % (ref 0–1.5)
BILIRUB SERPL-MCNC: 0.4 MG/DL (ref 0–1.2)
BLD GP AB SCN SERPL QL: NEGATIVE
BUN SERPL-MCNC: 5 MG/DL (ref 6–20)
BUN/CREAT SERPL: 8.9 (ref 7–25)
CALCIUM SPEC-SCNC: 9.4 MG/DL (ref 8.6–10.5)
CHLORIDE SERPL-SCNC: 103 MMOL/L (ref 98–107)
CO2 SERPL-SCNC: 17.6 MMOL/L (ref 22–29)
CREAT SERPL-MCNC: 0.56 MG/DL (ref 0.57–1)
DEPRECATED RDW RBC AUTO: 45.1 FL (ref 37–54)
EOSINOPHIL # BLD AUTO: 0.05 10*3/MM3 (ref 0–0.4)
EOSINOPHIL NFR BLD AUTO: 0.7 % (ref 0.3–6.2)
ERYTHROCYTE [DISTWIDTH] IN BLOOD BY AUTOMATED COUNT: 15.6 % (ref 12.3–15.4)
EXPIRATION DATE: NORMAL
GFR SERPL CREATININE-BSD FRML MDRD: 127 ML/MIN/1.73
GLOBULIN UR ELPH-MCNC: 3.6 GM/DL
GLUCOSE SERPL-MCNC: 76 MG/DL (ref 65–99)
HCT VFR BLD AUTO: 36.4 % (ref 34–46.6)
HGB BLD-MCNC: 11.7 G/DL (ref 12–15.9)
IMM GRANULOCYTES # BLD AUTO: 0.05 10*3/MM3 (ref 0–0.05)
IMM GRANULOCYTES NFR BLD AUTO: 0.7 % (ref 0–0.5)
LYMPHOCYTES # BLD AUTO: 1.41 10*3/MM3 (ref 0.7–3.1)
LYMPHOCYTES NFR BLD AUTO: 20 % (ref 19.6–45.3)
Lab: NORMAL
MCH RBC QN AUTO: 25.7 PG (ref 26.6–33)
MCHC RBC AUTO-ENTMCNC: 32.1 G/DL (ref 31.5–35.7)
MCV RBC AUTO: 79.8 FL (ref 79–97)
MONOCYTES # BLD AUTO: 0.58 10*3/MM3 (ref 0.1–0.9)
MONOCYTES NFR BLD AUTO: 8.2 % (ref 5–12)
NEUTROPHILS NFR BLD AUTO: 4.93 10*3/MM3 (ref 1.7–7)
NEUTROPHILS NFR BLD AUTO: 70.1 % (ref 42.7–76)
NRBC BLD AUTO-RTO: 0 /100 WBC (ref 0–0.2)
PLATELET # BLD AUTO: 184 10*3/MM3 (ref 140–450)
PMV BLD AUTO: 11.7 FL (ref 6–12)
POTASSIUM SERPL-SCNC: 4.1 MMOL/L (ref 3.5–5.2)
PROT SERPL-MCNC: 7.1 G/DL (ref 6–8.5)
PROT UR STRIP-MCNC: NEGATIVE MG/DL
RBC # BLD AUTO: 4.56 10*6/MM3 (ref 3.77–5.28)
RH BLD: POSITIVE
SARS-COV-2 RDRP RESP QL NAA+PROBE: NOT DETECTED
SODIUM SERPL-SCNC: 134 MMOL/L (ref 136–145)
T&S EXPIRATION DATE: NORMAL
WBC # BLD AUTO: 7.04 10*3/MM3 (ref 3.4–10.8)

## 2020-07-14 PROCEDURE — 85025 COMPLETE CBC W/AUTO DIFF WBC: CPT | Performed by: OBSTETRICS & GYNECOLOGY

## 2020-07-14 PROCEDURE — 25010000002 ROPIVACAINE PER 1 MG: Performed by: ANESTHESIOLOGY

## 2020-07-14 PROCEDURE — 86850 RBC ANTIBODY SCREEN: CPT | Performed by: OBSTETRICS & GYNECOLOGY

## 2020-07-14 PROCEDURE — 25010000002 PENICILLIN G POTASSIUM PER 600000 UNITS: Performed by: OBSTETRICS & GYNECOLOGY

## 2020-07-14 PROCEDURE — 59400 OBSTETRICAL CARE: CPT | Performed by: OBSTETRICS & GYNECOLOGY

## 2020-07-14 PROCEDURE — 86900 BLOOD TYPING SEROLOGIC ABO: CPT | Performed by: OBSTETRICS & GYNECOLOGY

## 2020-07-14 PROCEDURE — 81002 URINALYSIS NONAUTO W/O SCOPE: CPT | Performed by: OBSTETRICS & GYNECOLOGY

## 2020-07-14 PROCEDURE — 87635 SARS-COV-2 COVID-19 AMP PRB: CPT | Performed by: OBSTETRICS & GYNECOLOGY

## 2020-07-14 PROCEDURE — 25010000002 ONDANSETRON PER 1 MG: Performed by: OBSTETRICS & GYNECOLOGY

## 2020-07-14 PROCEDURE — 80053 COMPREHEN METABOLIC PANEL: CPT | Performed by: OBSTETRICS & GYNECOLOGY

## 2020-07-14 PROCEDURE — C1755 CATHETER, INTRASPINAL: HCPCS

## 2020-07-14 PROCEDURE — C1755 CATHETER, INTRASPINAL: HCPCS | Performed by: ANESTHESIOLOGY

## 2020-07-14 PROCEDURE — 10907ZC DRAINAGE OF AMNIOTIC FLUID, THERAPEUTIC FROM PRODUCTS OF CONCEPTION, VIA NATURAL OR ARTIFICIAL OPENING: ICD-10-PCS | Performed by: OBSTETRICS & GYNECOLOGY

## 2020-07-14 PROCEDURE — 86901 BLOOD TYPING SEROLOGIC RH(D): CPT | Performed by: OBSTETRICS & GYNECOLOGY

## 2020-07-14 PROCEDURE — C9803 HOPD COVID-19 SPEC COLLECT: HCPCS | Performed by: OBSTETRICS & GYNECOLOGY

## 2020-07-14 PROCEDURE — 3E033VJ INTRODUCTION OF OTHER HORMONE INTO PERIPHERAL VEIN, PERCUTANEOUS APPROACH: ICD-10-PCS | Performed by: OBSTETRICS & GYNECOLOGY

## 2020-07-14 PROCEDURE — 88307 TISSUE EXAM BY PATHOLOGIST: CPT

## 2020-07-14 RX ORDER — ROPIVACAINE HYDROCHLORIDE 2 MG/ML
INJECTION, SOLUTION EPIDURAL; INFILTRATION; PERINEURAL AS NEEDED
Status: DISCONTINUED | OUTPATIENT
Start: 2020-07-14 | End: 2020-07-14 | Stop reason: SURG

## 2020-07-14 RX ORDER — SODIUM CHLORIDE, SODIUM LACTATE, POTASSIUM CHLORIDE, CALCIUM CHLORIDE 600; 310; 30; 20 MG/100ML; MG/100ML; MG/100ML; MG/100ML
125 INJECTION, SOLUTION INTRAVENOUS CONTINUOUS
Status: DISCONTINUED | OUTPATIENT
Start: 2020-07-14 | End: 2020-07-15

## 2020-07-14 RX ORDER — OXYTOCIN-SODIUM CHLORIDE 0.9% IV SOLN 30 UNIT/500ML 30-0.9/5 UT/ML-%
250 SOLUTION INTRAVENOUS CONTINUOUS
Status: DISPENSED | OUTPATIENT
Start: 2020-07-14 | End: 2020-07-14

## 2020-07-14 RX ORDER — FAMOTIDINE 10 MG/ML
20 INJECTION, SOLUTION INTRAVENOUS ONCE AS NEEDED
Status: DISCONTINUED | OUTPATIENT
Start: 2020-07-14 | End: 2020-07-14 | Stop reason: HOSPADM

## 2020-07-14 RX ORDER — OXYTOCIN-SODIUM CHLORIDE 0.9% IV SOLN 30 UNIT/500ML 30-0.9/5 UT/ML-%
2-30 SOLUTION INTRAVENOUS
Status: DISCONTINUED | OUTPATIENT
Start: 2020-07-14 | End: 2020-07-14 | Stop reason: HOSPADM

## 2020-07-14 RX ORDER — DIPHENHYDRAMINE HYDROCHLORIDE 50 MG/ML
12.5 INJECTION INTRAMUSCULAR; INTRAVENOUS EVERY 8 HOURS PRN
Status: DISCONTINUED | OUTPATIENT
Start: 2020-07-14 | End: 2020-07-14 | Stop reason: HOSPADM

## 2020-07-14 RX ORDER — ONDANSETRON 2 MG/ML
4 INJECTION INTRAMUSCULAR; INTRAVENOUS ONCE AS NEEDED
Status: DISCONTINUED | OUTPATIENT
Start: 2020-07-14 | End: 2020-07-14 | Stop reason: HOSPADM

## 2020-07-14 RX ORDER — ERYTHROMYCIN 5 MG/G
OINTMENT OPHTHALMIC
Status: DISPENSED
Start: 2020-07-14 | End: 2020-07-15

## 2020-07-14 RX ORDER — OXYTOCIN-SODIUM CHLORIDE 0.9% IV SOLN 30 UNIT/500ML 30-0.9/5 UT/ML-%
999 SOLUTION INTRAVENOUS ONCE
Status: COMPLETED | OUTPATIENT
Start: 2020-07-14 | End: 2020-07-14

## 2020-07-14 RX ORDER — ONDANSETRON 2 MG/ML
4 INJECTION INTRAMUSCULAR; INTRAVENOUS EVERY 6 HOURS PRN
Status: DISCONTINUED | OUTPATIENT
Start: 2020-07-14 | End: 2020-07-14 | Stop reason: HOSPADM

## 2020-07-14 RX ORDER — MAGNESIUM CARB/ALUMINUM HYDROX 105-160MG
30 TABLET,CHEWABLE ORAL ONCE
Status: DISCONTINUED | OUTPATIENT
Start: 2020-07-14 | End: 2020-07-14 | Stop reason: HOSPADM

## 2020-07-14 RX ORDER — EPHEDRINE SULFATE 50 MG/ML
5 INJECTION, SOLUTION INTRAVENOUS AS NEEDED
Status: DISCONTINUED | OUTPATIENT
Start: 2020-07-14 | End: 2020-07-14 | Stop reason: HOSPADM

## 2020-07-14 RX ORDER — LIDOCAINE HYDROCHLORIDE AND EPINEPHRINE 15; 5 MG/ML; UG/ML
INJECTION, SOLUTION EPIDURAL AS NEEDED
Status: DISCONTINUED | OUTPATIENT
Start: 2020-07-14 | End: 2020-07-14 | Stop reason: SURG

## 2020-07-14 RX ORDER — ONDANSETRON 4 MG/1
4 TABLET, FILM COATED ORAL EVERY 6 HOURS PRN
Status: DISCONTINUED | OUTPATIENT
Start: 2020-07-14 | End: 2020-07-14 | Stop reason: HOSPADM

## 2020-07-14 RX ORDER — PENICILLIN G 3000000 [IU]/50ML
3 INJECTION, SOLUTION INTRAVENOUS EVERY 4 HOURS
Status: DISCONTINUED | OUTPATIENT
Start: 2020-07-14 | End: 2020-07-14 | Stop reason: HOSPADM

## 2020-07-14 RX ORDER — CARBOPROST TROMETHAMINE 250 UG/ML
250 INJECTION, SOLUTION INTRAMUSCULAR AS NEEDED
Status: DISCONTINUED | OUTPATIENT
Start: 2020-07-14 | End: 2020-07-14 | Stop reason: HOSPADM

## 2020-07-14 RX ORDER — SODIUM CHLORIDE 0.9 % (FLUSH) 0.9 %
3 SYRINGE (ML) INJECTION EVERY 12 HOURS SCHEDULED
Status: DISCONTINUED | OUTPATIENT
Start: 2020-07-14 | End: 2020-07-14 | Stop reason: HOSPADM

## 2020-07-14 RX ORDER — PHYTONADIONE 1 MG/.5ML
INJECTION, EMULSION INTRAMUSCULAR; INTRAVENOUS; SUBCUTANEOUS
Status: DISPENSED
Start: 2020-07-14 | End: 2020-07-15

## 2020-07-14 RX ORDER — METHYLERGONOVINE MALEATE 0.2 MG/ML
200 INJECTION INTRAVENOUS ONCE AS NEEDED
Status: DISCONTINUED | OUTPATIENT
Start: 2020-07-14 | End: 2020-07-14 | Stop reason: HOSPADM

## 2020-07-14 RX ORDER — SODIUM CHLORIDE 0.9 % (FLUSH) 0.9 %
10 SYRINGE (ML) INJECTION AS NEEDED
Status: DISCONTINUED | OUTPATIENT
Start: 2020-07-14 | End: 2020-07-14 | Stop reason: HOSPADM

## 2020-07-14 RX ORDER — LIDOCAINE HYDROCHLORIDE 10 MG/ML
5 INJECTION, SOLUTION EPIDURAL; INFILTRATION; INTRACAUDAL; PERINEURAL AS NEEDED
Status: DISCONTINUED | OUTPATIENT
Start: 2020-07-14 | End: 2020-07-14 | Stop reason: HOSPADM

## 2020-07-14 RX ORDER — ACETAMINOPHEN 325 MG/1
650 TABLET ORAL EVERY 4 HOURS PRN
Status: DISCONTINUED | OUTPATIENT
Start: 2020-07-14 | End: 2020-07-14 | Stop reason: HOSPADM

## 2020-07-14 RX ORDER — OXYTOCIN-SODIUM CHLORIDE 0.9% IV SOLN 30 UNIT/500ML 30-0.9/5 UT/ML-%
125 SOLUTION INTRAVENOUS CONTINUOUS PRN
Status: COMPLETED | OUTPATIENT
Start: 2020-07-14 | End: 2020-07-14

## 2020-07-14 RX ORDER — BUPIVACAINE HYDROCHLORIDE AND EPINEPHRINE 2.5; 5 MG/ML; UG/ML
INJECTION, SOLUTION EPIDURAL; INFILTRATION; INTRACAUDAL; PERINEURAL
Status: DISPENSED
Start: 2020-07-14 | End: 2020-07-15

## 2020-07-14 RX ORDER — MISOPROSTOL 200 UG/1
800 TABLET ORAL AS NEEDED
Status: DISCONTINUED | OUTPATIENT
Start: 2020-07-14 | End: 2020-07-14 | Stop reason: HOSPADM

## 2020-07-14 RX ADMIN — ONDANSETRON 4 MG: 2 INJECTION INTRAMUSCULAR; INTRAVENOUS at 19:57

## 2020-07-14 RX ADMIN — PENICILLIN G 3 MILLION UNITS: 3000000 INJECTION, SOLUTION INTRAVENOUS at 16:25

## 2020-07-14 RX ADMIN — OXYTOCIN 125 ML/HR: 10 INJECTION, SOLUTION INTRAMUSCULAR; INTRAVENOUS at 23:30

## 2020-07-14 RX ADMIN — SODIUM CHLORIDE, POTASSIUM CHLORIDE, SODIUM LACTATE AND CALCIUM CHLORIDE 125 ML/HR: 600; 310; 30; 20 INJECTION, SOLUTION INTRAVENOUS at 12:05

## 2020-07-14 RX ADMIN — Medication 8 ML/HR: at 17:52

## 2020-07-14 RX ADMIN — SODIUM CHLORIDE 5 MILLION UNITS: 900 INJECTION INTRAVENOUS at 12:15

## 2020-07-14 RX ADMIN — PENICILLIN G 3 MILLION UNITS: 3000000 INJECTION, SOLUTION INTRAVENOUS at 20:17

## 2020-07-14 RX ADMIN — LIDOCAINE HYDROCHLORIDE AND EPINEPHRINE 3 ML: 15; 5 INJECTION, SOLUTION EPIDURAL at 17:44

## 2020-07-14 RX ADMIN — SODIUM CHLORIDE, POTASSIUM CHLORIDE, SODIUM LACTATE AND CALCIUM CHLORIDE 125 ML/HR: 600; 310; 30; 20 INJECTION, SOLUTION INTRAVENOUS at 21:23

## 2020-07-14 RX ADMIN — ROPIVACAINE HYDROCHLORIDE 5 ML: 2 INJECTION, SOLUTION EPIDURAL; INFILTRATION at 17:48

## 2020-07-14 RX ADMIN — OXYTOCIN 999 ML/HR: 10 INJECTION INTRAVENOUS at 22:00

## 2020-07-14 RX ADMIN — OXYTOCIN 2 MILLI-UNITS/MIN: 10 INJECTION, SOLUTION INTRAMUSCULAR; INTRAVENOUS at 12:10

## 2020-07-14 NOTE — PLAN OF CARE
Problem: Patient Care Overview  Goal: Plan of Care Review  Outcome: Ongoing (interventions implemented as appropriate)  Flowsheets  Taken 7/14/2020 8748  Progress: improving  Outcome Summary: IOL for pregnancy induced hypertension. Labs WNL. Pitocin titrated for IOL. Epidural in. Expecing baby number 4, little girl.  Taken 7/14/2020 1212  Plan of Care Reviewed With: patient;significant other   Dontrell Kirk RN

## 2020-07-14 NOTE — ANESTHESIA PROCEDURE NOTES
Epidural Block      Patient reassessed immediately prior to procedure    Patient location during procedure: OB  Start Time: 7/14/2020 5:34 PM  Stop Time: 7/14/2020 5:44 PM  Indication:at surgeon's request and procedure for pain  Performed By  Anesthesiologist: Enrique Benítez DO  Preanesthetic Checklist  Completed: patient identified, site marked, surgical consent, pre-op evaluation, timeout performed, IV checked, risks and benefits discussed and monitors and equipment checked  Prep:  Pt Position:sitting  Sterile Tech:cap, gloves, mask and sterile barrier  Prep:chlorhexidine gluconate and isopropyl alcohol  Monitoring:blood pressure monitoring, continuous pulse oximetry and EKG  Epidural Block Procedure:  Approach:midline  Guidance:landmark technique and palpation technique  Location:lumbar  Level:4-5  Needle Type:Tuohy  Needle Gauge:17 G  Cath Size: 19 G  Loss of Resistance Medium: air  Loss of Resistance: 7cm  Cath Depth at skin:12 cm  Paresthesia: none  Aspiration:negative  Test Dose:negative  Post Assessment:  Dressing:occlusive dressing applied and secured with tape  Pt Tolerance:patient tolerated the procedure well with no apparent complications and no signs or symtoms of SAB or intravascular injection  Complications:no

## 2020-07-14 NOTE — PROGRESS NOTES
AROM performed with return of clear fluid.  SVE 2cm/50%/-2.  Patient becoming more uncomfortable.  Fetal tracing currently Category 1.  Will continue with pitocin induction and may have epidural upon request.

## 2020-07-14 NOTE — H&P
[facility]  Obstetric History and Physical    Chief Complaint   Patient presents with   • Scheduled Induction     Elev BPs in office. Denies vag bleeding, LOF. +FM. Occas ctx.       Subjective     Patient is a 30 y.o. female  currently at 38w2d, who presents for induction for gestational hypertension. Workup for preeclampsia was negative and patient has no si/sx of preeclampsia.  Pregnancy also complicated by maternal anemia.    The following portions of the patients history were reviewed and updated as appropriate: current medications, allergies, past medical history, past surgical history, past family history, past social history and problem list .       Prenatal Information:  Prenatal Results     POC Urine Glucose/Protein     Test Value Reference Range Date Time    Urine Glucose Negative  Negative, 1000 mg/dL (3+) 20     Urine Protein Trace  Negative 20           Initial Prenatal Labs     Test Value Reference Range Date Time    Hemoglobin 11.7 g/dL 11.1 - 15.9 19 1444    Hematocrit 35.7 % 34.0 - 46.6 19 1444    Platelets 171 10*3/mm3 140 - 450 20 1455      196 10*3/mm3 140 - 450 20 0959      317 x10E3/uL 150 - 450 19 1444    Rubella IgG 8.69 index Immune >0.99 19 1444    Hepatitis B SAg Negative  Negative 19 1444    Hepatitis C Ab 0.1 s/co ratio 0.0 - 0.9 19 1444    RPR Non Reactive  Non Reactive 19 1444    ABO A   19 1444    Rh Positive   19 1444    Antibody Screen Negative  Negative 19 1444    HIV Non Reactive  Non Reactive 19 1444    Urine Culture Final report   19 1504    Gonorrhea Negative  Negative 04/15/20 1305    Chlamydia Negative  Negative 04/15/20 1305    TSH              2nd and 3rd Trimester     Test Value Reference Range Date Time    Hemoglobin (repeated) 10.5 g/dL 12.0 - 15.9 20 1455      10.3 g/dL 12.0 - 15.9 20 0959    Hematocrit (repeated) 32.1 % 34.0 - 46.6 20 1455       32.1 % 34.0 - 46.6 05/08/20 0959     mg/dL 65 - 179 05/08/20 0959    Antibody Screen (repeated)        GTT Fasting 93 mg/dL 65 - 94 05/22/20 0908    GTT 1 Hr 172 mg/dL 65 - 179 05/22/20 0908    GTT 2 Hr 157 mg/dL 65 - 154 05/22/20 0908    GTT 3 Hr 125 mg/dL 65 - 139 05/22/20 0908    Group B Strep Positive  Negative 06/24/20 1522          Drug Screening     Test Value Reference Range Date Time    Amphetamine Screen Negative ng/mL Tmatom=8554 12/03/19 1506    Barbiturate Screen Negative ng/mL Mmxxnv=545 12/03/19 1506    Benzodiazepine Screen Negative ng/mL Mrwgse=857 12/03/19 1506    Methadone Screen Negative ng/mL Rtkndm=728 12/03/19 1506    Phencyclidine Screen Negative ng/mL Cutoff=25 12/03/19 1506    Opiates Screen Negative ng/mL Upxifp=815 12/03/19 1506    THC Screen Negative ng/mL Cutoff=50 12/03/19 1506    Cocaine Screen Negative ng/mL Rlidrr=242 12/03/19 1506    Propoxyphene Screen Negative ng/mL Aenxrv=553 12/03/19 1506    Buprenorphine Screen        Methamphetamine Screen        Oxycodone Screen        Tricyclic Antidepressants Screen              Other (Risk screening)     Test Value Reference Range Date Time    Varicella IgG        Parvovirus IgG        CMV IgG        Cystic Fibrosis        Hemoglobin electrophoresis        NIPT        MSAFP-4 *Screen Negative*   02/14/20 1315    AFP (for NTD only)                  External Prenatal Results     Pregnancy Outside Results - Transcribed From Office Records - See Scanned Records For Details     Test Value Date Time    Hgb 10.5 g/dL 07/01/20 1455      10.3 g/dL 05/08/20 0959      11.7 g/dL 12/03/19 1444    Hct 32.1 % 07/01/20 1455      32.1 % 05/08/20 0959      35.7 % 12/03/19 1444    ABO A  12/03/19 1444    Rh Positive  12/03/19 1444    Antibody Screen Negative  12/03/19 1444    Glucose Fasting GTT 93 mg/dL 05/22/20 0908    Glucose Tolerance Test 1 hour 172 mg/dL 05/22/20 0908    Glucose Tolerance Test 3 hour 125 mg/dL 05/22/20 0908    Gonorrhea  (discrete) Negative  04/15/20 1305    Chlamydia (discrete) Negative  04/15/20 1305    RPR Non Reactive  19 1444    VDRL       Syphilis Antibody <0.2 AI 19 0942    Rubella 8.69 index 19 1444    HBsAg Negative  19 1444    Herpes Simplex Virus PCR       Herpes Simplex VIrus Culture       HIV Non Reactive  19 1444    Hep C RNA Quant PCR       Hep C Antibody 0.1 s/co ratio 19 1444    AFP 31.2 ng/mL 20 1315    Group B Strep Positive  20 1522    GBS Susceptibility to Clindamycin       GBS Susceptibility to Erythromycin       Fetal Fibronectin       Genetic Testing, Maternal Blood             Drug Screening     Test Value Date Time    Urine Drug Screen       Amphetamine Screen Negative ng/mL 19 1506    Barbiturate Screen Negative ng/mL 19 1506    Benzodiazepine Screen Negative ng/mL 19 1506    Methadone Screen Negative ng/mL 19 1506    Phencyclidine Screen Negative ng/mL 19 1506    Opiates Screen       THC Screen       Cocaine Screen       Propoxyphene Screen Negative ng/mL 19 1506    Buprenorphine Screen       Methamphetamine Screen       Oxycodone Screen       Tricyclic Antidepressants Screen                    Past OB History:     OB History    Para Term  AB Living   4 3 3 0 0 3   SAB TAB Ectopic Molar Multiple Live Births   0 0 0 0 0 0      # Outcome Date GA Lbr Vinicius/2nd Weight Sex Delivery Anes PTL Lv   4 Current            3 Term 16 40w0d  3629 g (8 lb) M Vag-Spont      2 Term 12 40w0d  3175 g (7 lb) M Vag-Spont      1 Term 11 40w0d  3175 g (7 lb) F Vag-Spont          Past Medical History: No past medical history on file.   Past Surgical History Past Surgical History:   Procedure Laterality Date   • WISDOM TOOTH EXTRACTION        Family History: History reviewed. No pertinent family history.   Social History:  reports that she has never smoked. She has never used smokeless tobacco.   reports that she does  not drink alcohol.   reports that she does not use drugs.        General ROS: Pertinent items are noted in HPI, all other systems reviewed and negative    Objective       Vital Signs Range for the last 24 hours  Temperature: Temp:  [98.8 °F (37.1 °C)] 98.8 °F (37.1 °C)   Temp Source: Temp src: Oral   BP:     Pulse:     Respirations:     SPO2: SpO2:  [97 %] 97 %   O2 Amount (l/min):     O2 Devices Device (Oxygen Therapy): room air   Weight: Weight:  [89.8 kg (198 lb)] 89.8 kg (198 lb)     Physical Examination: General appearance - alert, well appearing, and in no distress  Abdomen - gravid, nontender, EFW 7#  Pelvic - Cervix 2 cm/50%/-2  Extremities - pedal edema 1 +    Presentation: vertex   Cervix: Exam by: Method: (P) sterile exam per physician   Dilation: Cervical Dilation (cm): (P) 2   Effacement: Cervical Effacement: (P) 50%   Station: Fetal Station: (P) 1--> -2           Assessment/Plan       Gestational hypertension    Maternal anemia in pregnancy, antepartum    Positive GBS test        Assessment:  1.  Intrauterine pregnancy at 38w2d gestation with reassuring fetal status.    2.  induction of labor  for gestational hypertension  with favorable cervix  3.  Obstetrical history significant for gestational hypertension and maternal anemia.  4.  GBS status:   Strep Gp B Culture   Date Value Ref Range Status   2020 Positive (A) Negative Final     Comment:     Centers for Disease Control and Prevention (CDC) and American Congress  of Obstetricians and Gynecologists (ACOG) guidelines for prevention of   group B streptococcal (GBS) disease specify co-collection of  a vaginal and rectal swab specimen to maximize sensitivity of GBS  detection. Per the CDC and ACOG, swabbing both the lower vagina and  rectum substantially increases the yield of detection compared with  sampling the vagina alone.  Penicillin G, ampicillin, or cefazolin are indicated for intrapartum  prophylaxis of  GBS  colonization. Reflex susceptibility  testing should be performed prior to use of clindamycin only on GBS  isolates from penicillin-allergic women who are considered a high risk  for anaphylaxis. Treatment with vancomycin without additional testing  is warranted if resistance to clindamycin is noted.         Plan:  1. fetal and uterine monitoring  continuously, labor augmentation  Pitocin, analgesia with  epidural and antibiotic for GBS  2. Plan of care has been reviewed with patient.  3.  Risks, benefits of treatment plan have been discussed.  4.  All questions have been answered.      Penelope Pate MD  7/14/2020  12:14

## 2020-07-14 NOTE — ANESTHESIA PREPROCEDURE EVALUATION
Anesthesia Evaluation     Patient summary reviewed and Nursing notes reviewed                Airway   Mallampati: II  TM distance: >3 FB  Neck ROM: full  Dental - normal exam     Pulmonary - negative pulmonary ROS and normal exam    breath sounds clear to auscultation  Cardiovascular - negative cardio ROS and normal exam    Rhythm: regular  Rate: normal    (-) angina, orthopnea, PND, TALBOT      Neuro/Psych- negative ROS  GI/Hepatic/Renal/Endo - negative ROS     Musculoskeletal (-) negative ROS    Abdominal    Substance History - negative use     OB/GYN    (+) pregnancy induced hypertension        Other - negative ROS                       Anesthesia Plan    ASA 3     epidural       Anesthetic plan, all risks, benefits, and alternatives have been provided, discussed and informed consent has been obtained with: patient.

## 2020-07-15 LAB
BASOPHILS # BLD AUTO: 0.01 10*3/MM3 (ref 0–0.2)
BASOPHILS NFR BLD AUTO: 0.1 % (ref 0–1.5)
DEPRECATED RDW RBC AUTO: 45.4 FL (ref 37–54)
EOSINOPHIL # BLD AUTO: 0.02 10*3/MM3 (ref 0–0.4)
EOSINOPHIL NFR BLD AUTO: 0.2 % (ref 0.3–6.2)
ERYTHROCYTE [DISTWIDTH] IN BLOOD BY AUTOMATED COUNT: 15.5 % (ref 12.3–15.4)
HCT VFR BLD AUTO: 30.9 % (ref 34–46.6)
HGB BLD-MCNC: 10.2 G/DL (ref 12–15.9)
IMM GRANULOCYTES # BLD AUTO: 0.09 10*3/MM3 (ref 0–0.05)
IMM GRANULOCYTES NFR BLD AUTO: 0.8 % (ref 0–0.5)
LYMPHOCYTES # BLD AUTO: 1.26 10*3/MM3 (ref 0.7–3.1)
LYMPHOCYTES NFR BLD AUTO: 11.2 % (ref 19.6–45.3)
MCH RBC QN AUTO: 26.2 PG (ref 26.6–33)
MCHC RBC AUTO-ENTMCNC: 33 G/DL (ref 31.5–35.7)
MCV RBC AUTO: 79.4 FL (ref 79–97)
MONOCYTES # BLD AUTO: 1.1 10*3/MM3 (ref 0.1–0.9)
MONOCYTES NFR BLD AUTO: 9.8 % (ref 5–12)
NEUTROPHILS NFR BLD AUTO: 77.9 % (ref 42.7–76)
NEUTROPHILS NFR BLD AUTO: 8.78 10*3/MM3 (ref 1.7–7)
NRBC BLD AUTO-RTO: 0 /100 WBC (ref 0–0.2)
PLATELET # BLD AUTO: 172 10*3/MM3 (ref 140–450)
PMV BLD AUTO: 11.7 FL (ref 6–12)
RBC # BLD AUTO: 3.89 10*6/MM3 (ref 3.77–5.28)
WBC # BLD AUTO: 11.26 10*3/MM3 (ref 3.4–10.8)

## 2020-07-15 PROCEDURE — 85025 COMPLETE CBC W/AUTO DIFF WBC: CPT | Performed by: OBSTETRICS & GYNECOLOGY

## 2020-07-15 PROCEDURE — 0503F POSTPARTUM CARE VISIT: CPT | Performed by: OBSTETRICS & GYNECOLOGY

## 2020-07-15 RX ORDER — HYDROCORTISONE 25 MG/G
1 CREAM TOPICAL AS NEEDED
Status: DISCONTINUED | OUTPATIENT
Start: 2020-07-15 | End: 2020-07-16 | Stop reason: HOSPADM

## 2020-07-15 RX ORDER — PRENATAL VIT/IRON FUM/FOLIC AC 27MG-0.8MG
1 TABLET ORAL DAILY
Status: DISCONTINUED | OUTPATIENT
Start: 2020-07-15 | End: 2020-07-16 | Stop reason: HOSPADM

## 2020-07-15 RX ORDER — BISACODYL 10 MG
10 SUPPOSITORY, RECTAL RECTAL DAILY PRN
Status: DISCONTINUED | OUTPATIENT
Start: 2020-07-15 | End: 2020-07-16 | Stop reason: HOSPADM

## 2020-07-15 RX ORDER — SODIUM CHLORIDE 0.9 % (FLUSH) 0.9 %
1-10 SYRINGE (ML) INJECTION AS NEEDED
Status: DISCONTINUED | OUTPATIENT
Start: 2020-07-15 | End: 2020-07-16 | Stop reason: HOSPADM

## 2020-07-15 RX ORDER — DOCUSATE SODIUM 100 MG/1
100 CAPSULE, LIQUID FILLED ORAL 2 TIMES DAILY
Status: DISCONTINUED | OUTPATIENT
Start: 2020-07-15 | End: 2020-07-16 | Stop reason: HOSPADM

## 2020-07-15 RX ORDER — IBUPROFEN 600 MG/1
600 TABLET ORAL EVERY 8 HOURS PRN
Status: DISCONTINUED | OUTPATIENT
Start: 2020-07-15 | End: 2020-07-16 | Stop reason: HOSPADM

## 2020-07-15 RX ORDER — ONDANSETRON 2 MG/ML
4 INJECTION INTRAMUSCULAR; INTRAVENOUS EVERY 6 HOURS PRN
Status: DISCONTINUED | OUTPATIENT
Start: 2020-07-15 | End: 2020-07-16 | Stop reason: HOSPADM

## 2020-07-15 RX ORDER — PROMETHAZINE HYDROCHLORIDE 25 MG/ML
12.5 INJECTION, SOLUTION INTRAMUSCULAR; INTRAVENOUS EVERY 6 HOURS PRN
Status: DISCONTINUED | OUTPATIENT
Start: 2020-07-15 | End: 2020-07-16 | Stop reason: HOSPADM

## 2020-07-15 RX ORDER — PROMETHAZINE HYDROCHLORIDE 25 MG/1
25 TABLET ORAL EVERY 6 HOURS PRN
Status: DISCONTINUED | OUTPATIENT
Start: 2020-07-15 | End: 2020-07-16 | Stop reason: HOSPADM

## 2020-07-15 RX ORDER — OXYCODONE HYDROCHLORIDE AND ACETAMINOPHEN 5; 325 MG/1; MG/1
1 TABLET ORAL EVERY 4 HOURS PRN
Status: DISCONTINUED | OUTPATIENT
Start: 2020-07-15 | End: 2020-07-16 | Stop reason: HOSPADM

## 2020-07-15 RX ORDER — ONDANSETRON 4 MG/1
4 TABLET, FILM COATED ORAL EVERY 6 HOURS PRN
Status: DISCONTINUED | OUTPATIENT
Start: 2020-07-15 | End: 2020-07-16 | Stop reason: HOSPADM

## 2020-07-15 RX ORDER — PROMETHAZINE HYDROCHLORIDE 12.5 MG/1
12.5 SUPPOSITORY RECTAL EVERY 6 HOURS PRN
Status: DISCONTINUED | OUTPATIENT
Start: 2020-07-15 | End: 2020-07-16 | Stop reason: HOSPADM

## 2020-07-15 RX ADMIN — DOCUSATE SODIUM 100 MG: 100 CAPSULE, LIQUID FILLED ORAL at 08:32

## 2020-07-15 RX ADMIN — IBUPROFEN 600 MG: 600 TABLET, FILM COATED ORAL at 12:52

## 2020-07-15 RX ADMIN — Medication 1 TABLET: at 08:32

## 2020-07-15 NOTE — PLAN OF CARE
Problem: Patient Care Overview  Goal: Plan of Care Review  Outcome: Ongoing (interventions implemented as appropriate)  Flowsheets  Taken 7/14/2020 1744 by Dontrell Kirk RN  Progress: improving  Outcome Summary: IOL for pregnancy induced hypertension. Labs WNL. Pitocin titrated for IOL. Epidural in. Expecing baby number 4, little girl.  Taken 7/14/2020 1212 by Dontrell Kirk RN  Plan of Care Reviewed With: patient;significant other     Problem: Patient Care Overview  Goal: Individualization and Mutuality  Outcome: Ongoing (interventions implemented as appropriate)  Flowsheets  Taken 7/14/2020 1744 by Dontrell Kirk RN  Patient Specific Goals (Include Timeframe): NICK care, breastfeeding in recovery time  What Information Would Help Us Give You More Personalized Care?: this baby is a girl. FOB's first baby  How Would You and/or Your Support Person Like to Participate in Your Care?: remain involved in POC and decision making.  Taken 7/14/2020 2238 by Debbi Leal RN  How to Address Anxieties/Fears: offer pain control as needed, check bleeding  Patient Specific Interventions: infant to breast per hunger signs  What Anxieties, Fears, Concerns, or Questions Do You Have About Your Care?: bleeding, pain control  Patient Specific Preferences: BF in 2 hr recovery     Problem: Patient Care Overview  Goal: Discharge Needs Assessment  Outcome: Ongoing (interventions implemented as appropriate)  Flowsheets  Taken 7/14/2020 1744 by Dontrell Kirk RN  Equipment Needed After Discharge: none  Anticipated Changes Related to Illness: none  Transportation Concerns: car, none  Concerns to be Addressed: no discharge needs identified  Readmission Within the Last 30 Days: no previous admission in last 30 days  Patient/Family Anticipated Services at Transition: none  Taken 7/14/2020 1145 by Dontrell Kirk RN  Equipment Currently Used at Home: none  Taken 7/14/2020 1142 by Dontrell Kirk, RN  Transportation  Anticipated: car, drives self  Patient/Family Anticipates Transition to: home     Problem: Patient Care Overview  Goal: Interprofessional Rounds/Family Conf  Outcome: Ongoing (interventions implemented as appropriate)  Flowsheets (Taken 2020 by Dontrell Kirk RN)  Participants: nursing;pharmacy;patient;physician     Problem: Labor (Cervical Ripen, Induct, Augment) (Adult,Obstetrics,Pediatric)  Goal: Signs and Symptoms of Listed Potential Problems Will be Absent, Minimized or Managed (Labor)  Outcome: Ongoing (interventions implemented as appropriate)  Flowsheets (Taken 2020 by Dontrell Kirk, RN)  Problems Assessed (Labor): all  Problems Present (Labor): none     Problem: Anesthesia/Analgesia, Neuraxial (Obstetrics)  Goal: Signs and Symptoms of Listed Potential Problems Will be Absent, Minimized or Managed (Anesthesia/Analgesia, Neuraxial)  Outcome: Ongoing (interventions implemented as appropriate)  Flowsheets (Taken 2020 by Dontrell Kirk RN)  Problems Assessed (Neuraxial Anesthesia/Analgesia, OB): all  Problems Present (Neuraxial Anesth OB): none     Problem: Fall Risk,  (Adult,Obstetrics,Pediatric)  Goal: Identify Related Risk Factors and Signs and Symptoms  Outcome: Ongoing (interventions implemented as appropriate)  Flowsheets (Taken 2020 by Dontrell Kirk RN)  Related Risk Factors (Fall Risk, ): pregnancy weight gain;regional anesthesia  Signs and Symptoms (Fall Risk, ): presence of fall risk factors     Problem: Fall Risk,  (Adult,Obstetrics,Pediatric)  Goal: Absence of Maternal Fall  Outcome: Ongoing (interventions implemented as appropriate)  Flowsheets (Taken 20208)  Absence of Maternal Fall: achieves outcome     Problem: Fall Risk,  (Adult,Obstetrics,Pediatric)  Goal: Absence of  Fall/Drop  Outcome: Ongoing (interventions implemented as appropriate)  Flowsheets (Taken 2020 by Reagan  Dontrell CUI RN)  Absence of  Fall/Drop: achieves outcome     Problem: Skin Injury Risk (Adult)  Goal: Identify Related Risk Factors and Signs and Symptoms  Outcome: Ongoing (interventions implemented as appropriate)  Flowsheets (Taken 2020 1744 by Dontrell Kirk RN)  Related Risk Factors (Skin Injury Risk): mobility impaired;moisture;medical devices;medication     Problem: Skin Injury Risk (Adult)  Goal: Skin Health and Integrity  Outcome: Ongoing (interventions implemented as appropriate)  Flowsheets (Taken 2020 1744 by Dontrell Kirk RN)  Skin Health and Integrity: achieves outcome     Problem: Labor (Cervical Ripen, Induct, Augment) (Adult,Obstetrics,Pediatric)  Intervention: Prevent/Manage Maternal Infection  Flowsheets (Taken 2020)  Infection Management: aseptic technique maintained  Perineal Care: absorbent pad changed; perineum cleansed  Infection Prevention: rest/sleep promoted; single patient room provided; visitors restricted/screened     Problem: Labor (Cervical Ripen, Induct, Augment) (Adult,Obstetrics,Pediatric)  Intervention: Position/Reposition to Promote Fetal Well-Being/Optimize Contraction Pattern  Flowsheets (Taken 2020 by Dontrell Kirk, RN)  Activity In Labor: bedrest     Problem: Labor (Cervical Ripen, Induct, Augment) (Adult,Obstetrics,Pediatric)  Intervention: Monitor/Manage Labor Dystocia  Flowsheets (Taken 2020)  Fluid/Electrolyte Management: fluids provided     Problem: Labor (Cervical Ripen, Induct, Augment) (Adult,Obstetrics,Pediatric)  Intervention: Monitor/Manage Labor Pain  Flowsheets (Taken 2020 by Dontrell Kirk RN)  Pain Management Interventions: pain management plan reviewed with patient/caregiver (also repositioned)     Problem: Labor (Cervical Ripen, Induct, Augment) (Adult,Obstetrics,Pediatric)  Intervention: Provide Emotional Support and Encouragement  Flowsheets (Taken 2020)  Trust  Relationship/Rapport: care explained; emotional support provided; questions answered; questions encouraged; thoughts/feelings acknowledged     Problem: Anesthesia/Analgesia, Neuraxial (Obstetrics)  Intervention: Implement Fall Risk Prevention Measures  Flowsheets (Taken 2020)  Safety Promotion/Fall Prevention: safety round/check completed     Problem: Fall Risk,  (Adult,Obstetrics,Pediatric)  Intervention: Monitor/Assist with Self Care  Flowsheets (Taken 2020)  Activity Assistance Provided: assistance, 1 person     Problem: Fall Risk,  (Adult,Obstetrics,Pediatric)  Intervention: Reduce Risk/Promote a Safe Environment  Flowsheets (Taken 2020)  Safety Promotion/Fall Prevention: safety round/check completed     Problem: Fall Risk,  (Adult,Obstetrics,Pediatric)  Intervention: Review Medications/Identify Contributors to Fall Risk  Flowsheets (Taken 2020)  Medication Review/Management: medications reviewed; dosing adjusted     Problem: Fall Risk,  (Adult,Obstetrics,Pediatric)  Intervention: Prevent [place] Drop/Fall  Flowsheets (Taken 2020)  Safety/Security Measures: family to remain at bedside     Problem: Skin Injury Risk (Adult)  Intervention: Prevent/Manage Excess Moisture  Flowsheets (Taken 2020)  Perineal Care: absorbent pad changed; perineum cleansed  Bathing/Skin Care: linen changed     Problem: Skin Injury Risk (Adult)  Intervention: Maintain Head of Bed Elevation Less Than 30 Degrees as Tolerated  Flowsheets (Taken 2020 1212 by Dontrell Kirk, RN)  Head of Bed (HOB): HOB at 60-90 degrees     Problem: Skin Injury Risk (Adult)  Intervention: Prevent/Minimize Shear/Friction Injuries  Flowsheets (Taken 2020)  Pressure Reduction Devices: positioning supports utilized     Problem: Skin Injury Risk (Adult)  Intervention: Prevent or Minimize Pressure  Flowsheets (Taken 2020)  Pressure Reduction  Techniques: frequent weight shift encouraged;sit time limited to 2 hours

## 2020-07-15 NOTE — LACTATION NOTE
Patient seen per RN request. Patient feels baby had a good latch and is having good output. Enc her to call for obs of latch at next feeding. Denies questions at this time . She has a personal pump from her insurance.

## 2020-07-15 NOTE — PROGRESS NOTES
DAILY PROGRESS NOTE    Patient Identification:  Name: Ada Anderson  Age: 30 y.o.  Sex: female  :  1990  MRN: [id number]               Subjective:  Interval History: Postpartum day #1 from a vaginal delivery.  Pain is controlled with pain medication.  Lochia is minimal.  The baby is breast-feeding.    Objective:    Scheduled Meds:  bupivacaine-EPINEPHrine PF      docusate sodium 100 mg Oral BID   erythromycin      phytonadione      prenatal vitamin 1 tablet Oral Daily     Continuous Infusions:   PRN Meds:•  all purpose nipple ointment  •  benzocaine  •  benzocaine-menthol  •  bisacodyl  •  pramoxine-hydrocortisone  •  Hydrocortisone (Perianal)  •  ibuprofen  •  magnesium hydroxide  •  ondansetron **OR** ondansetron  •  oxyCODONE-acetaminophen  •  promethazine **OR** promethazine **OR** promethazine  •  sodium chloride    Vital signs in last 24 hours:  Temp:  [97.4 °F (36.3 °C)-99.2 °F (37.3 °C)] 98.9 °F (37.2 °C)  Heart Rate:  [73-99] 92  Resp:  [16-18] 16  BP: (107-150)/(68-97) 118/73    Intake/Output:    Intake/Output Summary (Last 24 hours) at 7/15/2020 0833  Last data filed at 2020 2153  Gross per 24 hour   Intake 2314 ml   Output 1300 ml   Net 1014 ml       Exam:  General Appearance:    Alert, cooperative, no distress, appears stated age   Lungs:     Clear to auscultation bilaterally, respirations unlabored    Heart:    Regular rate and rhythm, S1 and S2 normal, no murmur, rub   or gallop   Abdomen:    Soft, nondistended.  The fundus is firm and nontender.   Extremities:  Equal in size with 1+ bipedal edema   Skin:   Skin color, texture, turgor normal, no rashes or lesions        Data Review:    Lab Results (last 24 hours)     Procedure Component Value Units Date/Time    CBC & Differential [[id number]] Collected:  07/15/20 0754    Specimen:  Blood from Arm, Right Updated:  07/15/20 0801    Narrative:       The following orders were created for panel order CBC & Differential.  Procedure                                Abnormality         Status                     ---------                               -----------         ------                     CBC Auto Differential[[id number]]                            In process                   Please view results for these tests on the individual orders.    CBC Auto Differential [[id number]] Collected:  07/15/20 0754    Specimen:  Blood from Arm, Right Updated:  07/15/20 0801    POC Protein, Urine, Qualitative, Dipstick [[id number]]  (Normal) Collected:  07/14/20 1814    Specimen:  Urine Updated:  07/14/20 1815     Protein, POC Negative mg/dL      Lot Number [id number]     Expiration Date 11/21    COVID PRE-OP / PRE-PROCEDURE SCREENING ORDER (NO ISOLATION) - Swab, Nasopharynx [[id number]] Collected:  07/14/20 1152    Specimen:  Swab from Nasopharynx Updated:  07/14/20 1505    Narrative:       The following orders were created for panel order COVID PRE-OP / PRE-PROCEDURE SCREENING ORDER (NO ISOLATION) - Swab, Nasopharynx.  Procedure                               Abnormality         Status                     ---------                               -----------         ------                     COVID-19, ABBOTT IN-HOUS...[[id number]]  Normal              Final result                 Please view results for these tests on the individual orders.    COVID-19, ABBOTT IN-HOUSE,NP Swab (NO TRANSPORT MEDIA) 2 HR TAT - Swab, Nasopharynx [[id number]]  (Normal) Collected:  07/14/20 1152    Specimen:  Swab from Nasopharynx Updated:  07/14/20 1505     COVID19 Not Detected    Narrative:       Fact sheet for providers: https://www.fda.gov/media/504678/download     Fact sheet for patients: https://www.fda.gov/media/448009/download    Comprehensive Metabolic Panel [[id number]]  (Abnormal) Collected:  07/14/20 1208    Specimen:  Blood Updated:  07/14/20 1255     Glucose 76 mg/dL      BUN 5 mg/dL      Creatinine 0.56 mg/dL      Sodium 134 mmol/L      Potassium 4.1 mmol/L      Chloride 103 mmol/L       CO2 17.6 mmol/L      Calcium 9.4 mg/dL      Total Protein 7.1 g/dL      Albumin 3.50 g/dL      ALT (SGPT) 9 U/L      AST (SGOT) 15 U/L      Alkaline Phosphatase 137 U/L      Total Bilirubin 0.4 mg/dL      eGFR Non African Amer 127 mL/min/1.73      Globulin 3.6 gm/dL      A/G Ratio 1.0 g/dL      BUN/Creatinine Ratio 8.9     Anion Gap 13.4 mmol/L     Narrative:       GFR Normal >60  Chronic Kidney Disease <60  Kidney Failure <15      CBC & Differential [[id number]] Collected:  20    Specimen:  Blood Updated:  20    Narrative:       The following orders were created for panel order CBC & Differential.  Procedure                               Abnormality         Status                     ---------                               -----------         ------                     CBC Auto Differential[[id number]]        Abnormal            Final result                 Please view results for these tests on the individual orders.    CBC Auto Differential [[id number]]  (Abnormal) Collected:  20    Specimen:  Blood Updated:  20     WBC 7.04 10*3/mm3      RBC 4.56 10*6/mm3      Hemoglobin 11.7 g/dL      Hematocrit 36.4 %      MCV 79.8 fL      MCH 25.7 pg      MCHC 32.1 g/dL      RDW 15.6 %      RDW-SD 45.1 fl      MPV 11.7 fL      Platelets 184 10*3/mm3      Neutrophil % 70.1 %      Lymphocyte % 20.0 %      Monocyte % 8.2 %      Eosinophil % 0.7 %      Basophil % 0.3 %      Immature Grans % 0.7 %      Neutrophils, Absolute 4.93 10*3/mm3      Lymphocytes, Absolute 1.41 10*3/mm3      Monocytes, Absolute 0.58 10*3/mm3      Eosinophils, Absolute 0.05 10*3/mm3      Basophils, Absolute 0.02 10*3/mm3      Immature Grans, Absolute 0.05 10*3/mm3      nRBC 0.0 /100 WBC         Assessment:     (normal spontaneous vaginal delivery)    Maternal anemia in pregnancy, antepartum    Positive GBS test    Gestational hypertension    Appropriate progress, postpartum day #1.  Blood pressures are in  the normal range.    Plan:  Likely home tomorrow.    Wililam Noguera MD  7/15/2020

## 2020-07-15 NOTE — L&D DELIVERY NOTE
[facility]  Vaginal Delivery Note    Delivery     Delivery: Vaginal, Spontaneous     YOB: 2020    Time of Birth:  Gestational Age 9:53 PM   38w2d     Anesthesia: Epidural     Delivering clinician: Penelope Pate    Forceps?   No   Vacuum? No    Shoulder dystocia present: No        Delivery narrative: Patient is a 30-year-old -0-0-3 that was admitted at 38 weeks 2 days for induction of labor for gestational hypertension.  Patient had recently had mildly elevated blood pressures in the office and work-up for preeclampsia was negative.  Patient was 2 cm on admission and GBS positive.  She was started on Pitocin and penicillin.  After her second dose of penicillin, she had assisted rupture of membranes for clear fluid.  When she became uncomfortable, she received an epidural.  She then progressed along a normal multiparous labor curve to complete dilation and +2 station.  At the very end of the first stage of labor, the baby had a prolonged deceleration down to the 60s and Pitocin was stopped and fetal heart rate recovered well with good variability.    Patient pushed with 2 contractions and delivered a live-born female infant in the occiput anterior position over an intact perineum.  There was a nuchal cord x1 that could not be easily reduced at the perineum.  With gentle posterior guidance of the fetal head and maternal pushing, the anterior shoulder was easily delivered followed by the remainder of the body.  The nuchal cord was reduced.  Baby was immediately vigorous and placed on mom's chest.  Delayed cord clamping was performed for 30 seconds and cord was clamped and cut by the mother.  Cord blood was collected and sent.  The placenta then delivered spontaneously and was intact with a three-vessel cord.  Inspection of her perineum revealed no lacerations.  Bimanual exam revealed a firm uterus with no remaining products of conception.  Patient tolerated the procedure well.  All counts were  correct at the end of the procedure.     Infant    Findings: female  infant     Infant observations: Weight: No birth weight on file.   Length:    in  Observations/Comments:  scale 4      Apgars: 9   @ 1 minute /    9   @ 5 minutes   Infant Name:      Placenta, Cord, and Fluid    Placenta delivered  Spontaneous     Cord: 3 vessels  present.   Nuchal Cord?  yes; Number of nuchal loops present:  1    Cord blood obtained: Yes    Cord gases obtained:  No    Cord gas results: Venous:  No results found for: PHCVEN    Arterial:  No results found for: PHCART     Repair    Episiotomy: Not recorded     No    Lacerations: No   Estimated Blood Loss: 100 mL           Complications  none    Disposition  Mother to Mother Baby/Postpartum  in stable condition currently.  Baby to remains with mom  in stable condition currently.      Penelope Pate MD  07/14/20  22:06

## 2020-07-16 VITALS
HEART RATE: 90 BPM | WEIGHT: 198 LBS | TEMPERATURE: 98.6 F | BODY MASS INDEX: 35.08 KG/M2 | DIASTOLIC BLOOD PRESSURE: 85 MMHG | HEIGHT: 63 IN | SYSTOLIC BLOOD PRESSURE: 129 MMHG | RESPIRATION RATE: 18 BRPM | OXYGEN SATURATION: 97 %

## 2020-07-16 PROCEDURE — 0503F POSTPARTUM CARE VISIT: CPT | Performed by: OBSTETRICS & GYNECOLOGY

## 2020-07-16 RX ORDER — IBUPROFEN 800 MG/1
800 TABLET ORAL EVERY 8 HOURS PRN
Qty: 21 TABLET | Refills: 0 | Status: SHIPPED | OUTPATIENT
Start: 2020-07-16 | End: 2020-08-12 | Stop reason: SDUPTHER

## 2020-07-16 RX ADMIN — DOCUSATE SODIUM 100 MG: 100 CAPSULE, LIQUID FILLED ORAL at 10:05

## 2020-07-16 RX ADMIN — IBUPROFEN 600 MG: 600 TABLET, FILM COATED ORAL at 01:29

## 2020-07-16 RX ADMIN — IBUPROFEN 600 MG: 600 TABLET, FILM COATED ORAL at 10:04

## 2020-07-16 RX ADMIN — Medication 1 TABLET: at 10:04

## 2020-07-16 NOTE — DISCHARGE SUMMARY
Date of Discharge:  2020    Discharge Diagnosis: 38-week intrauterine pregnancy with gestational hypertension-delivery    Presenting Problem/History of Present Illness  Active Hospital Problems    Diagnosis  POA   • ** (normal spontaneous vaginal delivery) [O80]  Not Applicable   • Gestational hypertension [O13.9]  Yes   • Positive GBS test [B95.1]  Yes   • Maternal anemia in pregnancy, antepartum [O99.019]  Yes      Resolved Hospital Problems   No resolved problems to display.          Hospital Course  Patient is a 30 y.o. female presented with gestational hypertension at 38 weeks.  He underwent induction of labor and progressed to an uncomplicated vaginal delivery of a viable female infant 6 pounds 10 ounces with Apgars of 9 and 9.  Her postpartum course was entirely uncomplicated.  Blood pressure monitoring remained stable.    Procedures Performed         Consults:   Consults     No orders found from 6/15/2020 to 7/15/2020.          Pertinent Test Results: A pos    Condition on Discharge:  Stable     Vital Signs  Temp:  [98.6 °F (37 °C)-99.3 °F (37.4 °C)] 98.6 °F (37 °C)  Heart Rate:  [83-97] 90  Resp:  [16-18] 18  BP: (111-161)/(74-94) 129/85    Physical Exam:   Abdomen soft, fundus firm and nontender.  Extremities without swelling or tenderness.    Discharge Disposition  Home or Self Care    Discharge Medications     Discharge Medications      New Medications      Instructions Start Date   ibuprofen 800 MG tablet  Commonly known as:  ADVIL,MOTRIN   800 mg, Oral, Every 8 Hours PRN         Continue These Medications      Instructions Start Date   ferrous sulfate 325 (65 FE) MG tablet   325 mg, Oral, Daily With Breakfast      Prenatal Vitamins 0.8 MG tablet   1 tablet, Oral, Daily         Stop These Medications    ondansetron 4 MG tablet  Commonly known as:  Zofran            Discharge Diet:   Diet Instructions     Diet: Regular      Discharge Diet:  Regular          Activity at Discharge:   Activity  Instructions     Activity as Tolerated      Pelvic Rest            Follow-up Appointments  No future appointments.  Additional Instructions for the Follow-ups that You Need to Schedule     Discharge Follow-up with Specified Provider: Dr Pate; 6 Weeks   As directed      To:  Dr Pate    Follow Up:  6 Weeks               Test Results Pending at Discharge       Carlos Thorpe MD  07/16/20  13:15    Time: Discharge 25 min

## 2020-07-16 NOTE — LACTATION NOTE
Mom wanting assistance with latching baby. Assisted mom with latching baby and mom reports latch feels better. Baby is nursing well at this time. Educated mom on importance of deep latching and ways to achieve it. [facility]. Educated on baby's expected output and weight gain  Lactation Consult Note    Evaluation Completed: 2020 09:42  Patient Name: Ada Anderson  :  1990  MRN:  [id number]     REFERRAL  INFORMATION:                                         DELIVERY HISTORY:          Skin to skin initiation date/time: 2020  9:54 PM   Skin to skin end date/time:              MATERNAL ASSESSMENT:                               INFANT ASSESSMENT:  Information for the patient's :  Parker AndersonmichelleOliver [[id number]]   No past medical history on file.                                                                                                                                MATERNAL INFANT FEEDING:                                                                       EQUIPMENT TYPE:                                 BREAST PUMPING:          LACTATION REFERRALS:

## 2020-07-16 NOTE — PLAN OF CARE
Problem: Patient Care Overview  Goal: Plan of Care Review  Outcome: Ongoing (interventions implemented as appropriate)  Flowsheets  Taken 2020 1744 by Dontrell Kirk RN  Progress: improving  Taken 7/15/2020 2100 by Leonid Richards RN  Plan of Care Reviewed With: patient;significant other  Note:   Vaginal delivery day 2. VSS; assessment WNL. Voiding and having stools. Breast-feeding, does well independently. Manages pain with Motrin.   Goal: Individualization and Mutuality  Outcome: Ongoing (interventions implemented as appropriate)  Goal: Discharge Needs Assessment  Outcome: Ongoing (interventions implemented as appropriate)  Goal: Interprofessional Rounds/Family Conf  Outcome: Ongoing (interventions implemented as appropriate)     Problem: Anesthesia/Analgesia, Neuraxial (Obstetrics)  Goal: Signs and Symptoms of Listed Potential Problems Will be Absent, Minimized or Managed (Anesthesia/Analgesia, Neuraxial)  Outcome: Ongoing (interventions implemented as appropriate)     Problem: Fall Risk,  (Adult,Obstetrics,Pediatric)  Goal: Identify Related Risk Factors and Signs and Symptoms  Outcome: Ongoing (interventions implemented as appropriate)  Goal: Absence of Maternal Fall  Outcome: Ongoing (interventions implemented as appropriate)  Goal: Absence of [place] Fall/Drop  Outcome: Ongoing (interventions implemented as appropriate)     Problem: Skin Injury Risk (Adult)  Goal: Identify Related Risk Factors and Signs and Symptoms  Outcome: Ongoing (interventions implemented as appropriate)  Goal: Skin Health and Integrity  Outcome: Ongoing (interventions implemented as appropriate)

## 2020-07-16 NOTE — PLAN OF CARE
Doing well. VSS. Pain well controlled with po motrin. Breastfeeding well, milk is in. Home today.  Problem: Patient Care Overview  Goal: Plan of Care Review  Outcome: Outcome(s) achieved  Flowsheets (Taken 2020)  Progress: improving  Plan of Care Reviewed With: patient  Goal: Individualization and Mutuality  Outcome: Outcome(s) achieved  Goal: Discharge Needs Assessment  Outcome: Outcome(s) achieved  Flowsheets (Taken 2020 09)  Equipment Needed After Discharge: none  Equipment Currently Used at Home: none  Anticipated Changes Related to Illness: none  Transportation Anticipated: family or friend will provide  Transportation Concerns: car, none  Concerns to be Addressed: no discharge needs identified  Readmission Within the Last 30 Days: no previous admission in last 30 days  Patient/Family Anticipated Services at Transition: none  Patient/Family Anticipates Transition to: home  Goal: Interprofessional Rounds/Family Conf  Outcome: Outcome(s) achieved  Flowsheets (Taken 2020 09)  Participants: nursing; patient; physician     Problem: Anesthesia/Analgesia, Neuraxial (Obstetrics)  Goal: Signs and Symptoms of Listed Potential Problems Will be Absent, Minimized or Managed (Anesthesia/Analgesia, Neuraxial)  Outcome: Outcome(s) achieved  Flowsheets (Taken 2020 09)  Problems Assessed (Neuraxial Anesthesia/Analgesia, OB): all  Problems Present (Neuraxial Anesth OB): none     Problem: Fall Risk,  (Adult,Obstetrics,Pediatric)  Goal: Identify Related Risk Factors and Signs and Symptoms  Outcome: Outcome(s) achieved  Flowsheets (Taken 2020 09)  Related Risk Factors (Fall Risk, ): sleep disturbance  Signs and Symptoms (Fall Risk, ): presence of fall risk factors  Goal: Absence of Maternal Fall  Outcome: Outcome(s) achieved  Flowsheets (Taken 2020 09)  Absence of Maternal Fall: achieves outcome  Goal: Absence of [place] Fall/Drop  Outcome: Outcome(s)  achieved  Flowsheets (Taken 2020 0945)  Absence of [place] Fall/Drop: achieves outcome     Problem: Skin Injury Risk (Adult)  Goal: Identify Related Risk Factors and Signs and Symptoms  Outcome: Outcome(s) achieved  Goal: Skin Health and Integrity  Outcome: Outcome(s) achieved     Problem: Breastfeeding (Adult,Obstetrics,Pediatric)  Goal: Signs and Symptoms of Listed Potential Problems Will be Absent, Minimized or Managed (Breastfeeding)  Outcome: Outcome(s) achieved  Flowsheets (Taken 2020 0945)  Problems Assessed (Breastfeeding): all  Problems Present (Breastfeeding): none     Problem: Postpartum (Vaginal Delivery) (Adult,Obstetrics,Pediatric)  Goal: Signs and Symptoms of Listed Potential Problems Will be Absent, Minimized or Managed (Postpartum)  Outcome: Outcome(s) achieved  Flowsheets (Taken 2020 0950)  Problems Assessed (Postpartum Vaginal Delivery): all  Problems Present (Postpartum Vag Deliv): none

## 2020-07-22 ENCOUNTER — TELEPHONE (OUTPATIENT)
Dept: OBSTETRICS AND GYNECOLOGY | Facility: CLINIC | Age: 30
End: 2020-07-22

## 2020-07-22 NOTE — TELEPHONE ENCOUNTER
Patient states that it has only been 7 days old and her umbilical cord has fallen off of and she states that it 'looks weird   Patient was informed to contact pediatrician

## 2020-08-12 ENCOUNTER — TELEPHONE (OUTPATIENT)
Dept: OBSTETRICS AND GYNECOLOGY | Facility: CLINIC | Age: 30
End: 2020-08-12

## 2020-08-12 RX ORDER — IBUPROFEN 800 MG/1
800 TABLET ORAL EVERY 8 HOURS PRN
Qty: 21 TABLET | Refills: 0 | Status: SHIPPED | OUTPATIENT
Start: 2020-08-12 | End: 2020-08-12 | Stop reason: SDUPTHER

## 2020-08-12 RX ORDER — IBUPROFEN 800 MG/1
800 TABLET ORAL EVERY 8 HOURS PRN
Qty: 21 TABLET | Refills: 0 | Status: SHIPPED | OUTPATIENT
Start: 2020-08-12 | End: 2020-09-28 | Stop reason: SDUPTHER

## 2020-08-12 NOTE — TELEPHONE ENCOUNTER
Patient called she is wanting to know if she can get a refill on her prescription for the Ibuprofen sent over to her pharmacy.

## 2020-08-14 ENCOUNTER — TELEPHONE (OUTPATIENT)
Dept: OBSTETRICS AND GYNECOLOGY | Facility: CLINIC | Age: 30
End: 2020-08-14

## 2020-08-14 NOTE — TELEPHONE ENCOUNTER
Pt called asked if you could see her on 8/25 in the afternoon at [facility] for her pp instead of 8/26. SM

## 2020-08-25 ENCOUNTER — POSTPARTUM VISIT (OUTPATIENT)
Dept: OBSTETRICS AND GYNECOLOGY | Facility: CLINIC | Age: 30
End: 2020-08-25

## 2020-08-25 VITALS
HEART RATE: 86 BPM | BODY MASS INDEX: 32.24 KG/M2 | WEIGHT: 182 LBS | SYSTOLIC BLOOD PRESSURE: 128 MMHG | DIASTOLIC BLOOD PRESSURE: 85 MMHG

## 2020-08-25 PROBLEM — O99.019 MATERNAL ANEMIA IN PREGNANCY, ANTEPARTUM: Status: RESOLVED | Noted: 2020-05-11 | Resolved: 2020-08-25

## 2020-08-25 PROBLEM — O13.9 GESTATIONAL HYPERTENSION: Status: RESOLVED | Noted: 2020-07-14 | Resolved: 2020-08-25

## 2020-08-25 PROBLEM — B95.1 POSITIVE GBS TEST: Status: RESOLVED | Noted: 2020-06-28 | Resolved: 2020-08-25

## 2020-08-25 PROCEDURE — 0503F POSTPARTUM CARE VISIT: CPT | Performed by: OBSTETRICS & GYNECOLOGY

## 2020-08-25 NOTE — PROGRESS NOTES
Subjective   Ada Anderson is a 30 y.o. female here for 6 wk pp exam      History of Present Illness   Patient is a 30 year old who is 6 weeks s/p  at 38 weeks.  Pregnancy was complicated by gestational hypertension.  Patient's blood pressure today is normal.  She has no complaints.  She is bottlefeeding and wishes to return to work.  She denies any concerns for postpartum depression and or anxiety.  The following portions of the patient's history were reviewed and updated as appropriate: allergies, current medications, past family history, past medical history, past social history, past surgical history and problem list.    Review of Systems   Genitourinary: Negative for menstrual problem and pelvic pain.   All other systems reviewed and are negative.      Objective   Physical Exam   Constitutional: She appears well-developed and well-nourished.   Cardiovascular: Normal rate and regular rhythm.   Pulmonary/Chest: Effort normal and breath sounds normal. Right breast exhibits no inverted nipple, no mass, no nipple discharge, no skin change and no tenderness. Left breast exhibits no inverted nipple, no mass, no nipple discharge, no skin change and no tenderness.   Abdominal: Soft. She exhibits no distension. There is no tenderness.   Genitourinary: Vagina normal, uterus normal and cervix normal. There is no rash, tenderness, lesion or injury on the right labia. There is no rash, tenderness, lesion or injury on the left labia. Right adnexum displays no mass, no tenderness and no fullness. Left adnexum displays no mass, no tenderness and no fullness.   Neurological: She is alert.   Psychiatric: She has a normal mood and affect.   Vitals reviewed.        Assessment/Plan   Ada was seen today for postpartum care.    Diagnoses and all orders for this visit:    Routine postpartum follow-up      Patient is doing well postpartum.  She may resume all normal activity with no restrictions.  She wants Mirena IUD for  contraception and she will return for IUD insertion.  She may follow-up in 1 year for annual exam.

## 2020-09-08 ENCOUNTER — TELEPHONE (OUTPATIENT)
Dept: OBSTETRICS AND GYNECOLOGY | Facility: CLINIC | Age: 30
End: 2020-09-08

## 2020-09-08 NOTE — TELEPHONE ENCOUNTER
The only other appt we have is at 9 AM on Sept 28 at [place]. If she can't make that, then she will have to see one of the other doctors while we are out on vacation. If she cant do that, then she will have to wait until we return.

## 2020-09-08 NOTE — TELEPHONE ENCOUNTER
Pt called would like to get scheduled for Mirena do not see anything soon. Can you help get this pt scheduled please

## 2020-09-28 ENCOUNTER — OFFICE VISIT (OUTPATIENT)
Dept: OBSTETRICS AND GYNECOLOGY | Facility: CLINIC | Age: 30
End: 2020-09-28

## 2020-09-28 VITALS
BODY MASS INDEX: 32.45 KG/M2 | SYSTOLIC BLOOD PRESSURE: 115 MMHG | DIASTOLIC BLOOD PRESSURE: 79 MMHG | WEIGHT: 183.2 LBS | HEART RATE: 74 BPM

## 2020-09-28 DIAGNOSIS — Z30.430 ENCOUNTER FOR INSERTION OF MIRENA IUD: Primary | ICD-10-CM

## 2020-09-28 LAB
B-HCG UR QL: NEGATIVE
INTERNAL NEGATIVE CONTROL: NEGATIVE
INTERNAL POSITIVE CONTROL: POSITIVE
Lab: NORMAL

## 2020-09-28 PROCEDURE — 81025 URINE PREGNANCY TEST: CPT | Performed by: OBSTETRICS & GYNECOLOGY

## 2020-09-28 PROCEDURE — 58300 INSERT INTRAUTERINE DEVICE: CPT | Performed by: OBSTETRICS & GYNECOLOGY

## 2020-09-28 RX ORDER — IBUPROFEN 800 MG/1
800 TABLET ORAL EVERY 8 HOURS PRN
Qty: 30 TABLET | Refills: 0 | Status: SHIPPED | OUTPATIENT
Start: 2020-09-28 | End: 2021-03-05 | Stop reason: SDUPTHER

## 2020-09-28 NOTE — PROGRESS NOTES
CC: Mirena insertion     Lot#:[id number]  Exp:11/2022  NDC:92892-068-83    Procedure: Mirena Intrauterine device insertion  Preoperative diagnosis: Desires long acting reversible contraception  Postoperative diagnosis: Same  Indications:  Patient counseled on all forms of birth control and discussed their risks, side effects, and bleeding profiles.  Patient has elected to try Mirena.  Findings: Small, mobile anteverted uterus  Anesthesia: None  Pathology: None  Estimated blood loss: Less than 5 mL  Complications: None    The risks, benefits, and alternatives to Mirena were explained at length with the patient. All her questions were answered and consents were signed.  The patient was placed in a dorsal lithotomy position on the examining table in [facility]. A bimanual exam confirmed the uterus was normal in size, anteverted, and midplane. A warmed metal speculum was inserted into the vagina and the cervix was brought into view. The cervix was prepped with Betadine. The anterior lip was grasped with a single-tooth tenaculum. The endometrial cavity was then sounded to 9 cm without use of a dilator. Gloves were then exchanged for sterile gloves. This sealed Mirena package was opened and the Mirena was removed in a sterile fashion.  The upper edge of the depth setting the flange was set at a uterine sound measured. The  was then carefully advanced to the cervical canal into the uterus to the level of the fundus. This was then backed off about 1.5-2 cm to allow sufficient space for the arms to open. The slider was then retracted about 1 cm and deployed the device. The device was then gently advanced to the fundus. The Mirena was then released by pulling the slider down all the way. The  was removed carefully from the uterus. The threads were then cut leaving 2-3 cm visible outside of the cervix.  The single-tooth tenaculum was removed from the anterior lip. Silver nitrate was applied to the tenaculum sites.  Good hemostasis was noted. All other instruments were removed from the vagina. There were no complications, and the patient tolerated the procedure well with a minimal amount of discomfort. The patient was counseled about the need to return in 4 weeks for string check. She was counseled about the need to use a backup method of contraception such as condoms until her post insertion exam was performed. The patient verbalized understanding that the Mirena will need to be removed/replaced after 5 years. The patient is counseled to contact us if she has any significant or increasing bleeding, pain, fever, chills, or other concerns. She is instructed to see a doctor right away if she believes that she may be pregnant at any time with the Mirena in place.

## 2020-11-09 ENCOUNTER — OFFICE VISIT (OUTPATIENT)
Dept: OBSTETRICS AND GYNECOLOGY | Facility: CLINIC | Age: 30
End: 2020-11-09

## 2020-11-09 VITALS
SYSTOLIC BLOOD PRESSURE: 125 MMHG | WEIGHT: 181.2 LBS | BODY MASS INDEX: 32.1 KG/M2 | DIASTOLIC BLOOD PRESSURE: 78 MMHG | HEART RATE: 85 BPM

## 2020-11-09 DIAGNOSIS — Z30.431 IUD CHECK UP: Primary | ICD-10-CM

## 2020-11-09 PROCEDURE — 99212 OFFICE O/P EST SF 10 MIN: CPT | Performed by: OBSTETRICS & GYNECOLOGY

## 2020-11-09 NOTE — PROGRESS NOTES
Subjective   Ada Anderson is a 30 y.o. female here for string check.      History of Present Illness   Patient is a 30-year-old female who presents for Mirena IUD string check.  She has no complaints.  She reports irregular bleeding, but states it is slowing down.  She denies any pelvic pain.    The following portions of the patient's history were reviewed and updated as appropriate: allergies, current medications, past family history, past medical history, past social history, past surgical history and problem list.    Review of Systems   Genitourinary: Negative for menstrual problem and pelvic pain.   All other systems reviewed and are negative.      Objective   Physical Exam  Physical Exam   Constitutional: She appears well-developed and well-nourished.   Abdominal: Soft. She exhibits no distension. There is no tenderness.   Genitourinary: Vagina normal and uterus normal. There is no rash, tenderness, lesion or injury on the right labia. There is no rash, tenderness, lesion or injury on the left labia. Cervix exhibits no motion tenderness, no discharge and no friability. Right adnexum displays no mass, no tenderness and no fullness. Left adnexum displays no mass, no tenderness and no fullness.   Genitourinary Comments: IUD strings visualized   Neurological: She is alert.   Psychiatric: She has a normal mood and affect.   Vitals reviewed.      Assessment/Plan   Diagnoses and all orders for this visit:    1. IUD check up (Primary)    IUD strings were visualized.  Patient was counseled on bleeding precautions after IUD placement.  She may follow-up for annual exams or sooner if needed.

## 2021-03-04 ENCOUNTER — TELEPHONE (OUTPATIENT)
Dept: OBSTETRICS AND GYNECOLOGY | Facility: CLINIC | Age: 31
End: 2021-03-04

## 2021-03-05 RX ORDER — IBUPROFEN 800 MG/1
800 TABLET ORAL EVERY 8 HOURS PRN
Qty: 30 TABLET | Refills: 0 | Status: SHIPPED | OUTPATIENT
Start: 2021-03-05

## 2021-04-16 ENCOUNTER — BULK ORDERING (OUTPATIENT)
Dept: CASE MANAGEMENT | Facility: OTHER | Age: 31
End: 2021-04-16

## 2021-04-16 DIAGNOSIS — Z23 IMMUNIZATION DUE: ICD-10-CM

## 2024-10-16 PROBLEM — H61.23 BILATERAL HEARING LOSS DUE TO CERUMEN IMPACTION: Status: ACTIVE | Noted: 2022-03-08

## 2024-10-16 PROBLEM — Z83.3 FAMILY HISTORY OF DIABETES MELLITUS: Status: ACTIVE | Noted: 2022-03-07

## 2024-10-16 PROBLEM — Z97.5 IUD (INTRAUTERINE DEVICE) IN PLACE: Status: ACTIVE | Noted: 2017-10-30

## 2024-10-16 PROBLEM — Z00.01 ENCOUNTER FOR WELL ADULT EXAM WITH ABNORMAL FINDINGS: Status: ACTIVE | Noted: 2022-03-07

## 2025-01-17 ENCOUNTER — OFFICE VISIT (OUTPATIENT)
Dept: OBSTETRICS AND GYNECOLOGY | Facility: CLINIC | Age: 35
End: 2025-01-17
Payer: COMMERCIAL

## 2025-01-17 VITALS
HEIGHT: 62 IN | SYSTOLIC BLOOD PRESSURE: 127 MMHG | HEART RATE: 80 BPM | WEIGHT: 186 LBS | DIASTOLIC BLOOD PRESSURE: 87 MMHG | BODY MASS INDEX: 34.23 KG/M2

## 2025-01-17 DIAGNOSIS — Z11.3 SCREEN FOR STD (SEXUALLY TRANSMITTED DISEASE): ICD-10-CM

## 2025-01-17 DIAGNOSIS — Z01.419 ENCOUNTER FOR GYNECOLOGICAL EXAMINATION: Primary | ICD-10-CM

## 2025-01-17 PROBLEM — Z97.5 IUD (INTRAUTERINE DEVICE) IN PLACE: Status: RESOLVED | Noted: 2017-10-30 | Resolved: 2025-01-17

## 2025-01-17 NOTE — PROGRESS NOTES
"Chief Complaint  Annual Exam- Pap- 2023    Subjective        Ada Anderson presents to [facility]  History of Present Illness  Patient is a 34-year-old that presents for gynecological exam.  She last saw me in 2020 and moved to [place].  She did have gynecological care in [place] and had a screening Pap smear in 2023 that was normal.  She has no complaints today.  She reports regular menstrual cycles.  She is currently not using any contraception and declines any contraception.  She denies any family history of cancer.    Objective   Vital Signs:  /87   Pulse 80   Ht 157.5 cm (62\")   Wt 84.4 kg (186 lb)   BMI 34.02 kg/m²   Estimated body mass index is 34.02 kg/m² as calculated from the following:    Height as of this encounter: 157.5 cm (62\").    Weight as of this encounter: 84.4 kg (186 lb).          Physical Exam  Vitals reviewed. Exam conducted with a chaperone present.   Constitutional:       Appearance: She is well-developed.   Cardiovascular:      Rate and Rhythm: Normal rate and regular rhythm.   Pulmonary:      Effort: Pulmonary effort is normal.      Breath sounds: Normal breath sounds.   Chest:   Breasts:     Right: No inverted nipple, mass, nipple discharge, skin change or tenderness.      Left: No inverted nipple, mass, nipple discharge, skin change or tenderness.   Abdominal:      General: There is no distension.      Palpations: Abdomen is soft.      Tenderness: There is no abdominal tenderness.   Genitourinary:     Labia:         Right: No rash, tenderness, lesion or injury.         Left: No rash, tenderness, lesion or injury.       Vagina: Normal.      Cervix: Normal.      Uterus: Normal.       Adnexa:         Right: No mass, tenderness or fullness.          Left: No mass, tenderness or fullness.     Neurological:      Mental Status: She is alert.        Result Review :                Assessment and Plan   Diagnoses and all orders for this visit:    1. " Encounter for gynecological examination (Primary)    2. Screen for STD (sexually transmitted disease)  -     Chlamydia trachomatis, Neisseria gonorrhoeae, Trichomonas vaginalis, PCR - Swab, Vagina  -     RPR, Rfx Qn RPR / Confirm TP  -     Hepatitis B Surface Antigen  -     Hepatitis C Antibody  -     HIV-1 / O / 2 Ag / Antibody 4th Generation    She was counseled on monthly self breast exams for breast health.  She is currently up-to-date on screening Pap smear and will need Pap smear in 1 year.  STD screening was ordered and she may follow-up in 1 year or sooner if needed.         Follow Up   Return in about 1 year (around 1/17/2026) for gynecological exam.  Patient was given instructions and counseling regarding her condition or for health maintenance advice. Please see specific information pulled into the AVS if appropriate.

## 2025-01-18 LAB
HBV SURFACE AG SERPL QL IA: NEGATIVE
HCV IGG SERPL QL IA: NON REACTIVE
HIV 1+2 AB+HIV1 P24 AG SERPL QL IA: NON REACTIVE
RPR SER QL: NON REACTIVE

## 2025-01-20 LAB
C TRACH RRNA SPEC QL NAA+PROBE: NEGATIVE
N GONORRHOEA RRNA SPEC QL NAA+PROBE: NEGATIVE
T VAGINALIS RRNA SPEC QL NAA+PROBE: NEGATIVE

## 2025-06-30 ENCOUNTER — DOCUMENTATION (OUTPATIENT)
Dept: URGENT CARE | Facility: CLINIC | Age: 35
End: 2025-06-30
Payer: COMMERCIAL

## 2025-07-01 NOTE — ED NOTES
Thank you for letting us care for you in your recent visit to our urgent care center. We would love to hear about your experience with us. Was this the first time you have visited our location?    We’re always looking for ways to make our patients’ experiences even better. Do you have any recommendations on ways we may improve?     I appreciate you taking the time to respond. Please be on the lookout for a survey about your recent visit from [employer] via text or email. We would greatly appreciate if you could fill that out and turn it back in. We want your voice to be heard and we value your feedback.   Thank you for choosing [facility] for your healthcare needs.

## 2025-07-08 ENCOUNTER — PATIENT ROUNDING (BHMG ONLY) (OUTPATIENT)
Dept: URGENT CARE | Facility: CLINIC | Age: 35
End: 2025-07-08
Payer: COMMERCIAL

## 2025-07-08 NOTE — ED NOTES
Thank you for letting us care for you in your recent visit to our urgent care center. We would love to hear about your experience with us. Was this the first time you have visited our location?    We’re always looking for ways to make our patients’ experiences even better. Do you have any recommendations on ways we may improve?     I appreciate you taking the time to respond. Please be on the lookout for a survey about your recent visit from [employer] via text or email. We would greatly appreciate if you could fill that out and turn it back in. We want your voice to be heard and we value your feedback.   Thank you for choosing [facility] for your healthcare needs.

## 2025-08-12 ENCOUNTER — OFFICE VISIT (OUTPATIENT)
Dept: FAMILY MEDICINE CLINIC | Facility: CLINIC | Age: 35
End: 2025-08-12
Payer: COMMERCIAL

## 2025-08-12 VITALS
RESPIRATION RATE: 16 BRPM | DIASTOLIC BLOOD PRESSURE: 74 MMHG | OXYGEN SATURATION: 98 % | WEIGHT: 177 LBS | BODY MASS INDEX: 32.57 KG/M2 | HEIGHT: 62 IN | SYSTOLIC BLOOD PRESSURE: 122 MMHG | TEMPERATURE: 96.6 F | HEART RATE: 80 BPM

## 2025-08-12 DIAGNOSIS — N93.9 VAGINAL SPOTTING: Primary | ICD-10-CM

## 2025-08-12 DIAGNOSIS — E66.811 CLASS 1 OBESITY DUE TO EXCESS CALORIES WITH SERIOUS COMORBIDITY AND BODY MASS INDEX (BMI) OF 32.0 TO 32.9 IN ADULT: ICD-10-CM

## 2025-08-12 DIAGNOSIS — D64.9 ANEMIA, UNSPECIFIED TYPE: ICD-10-CM

## 2025-08-12 DIAGNOSIS — E66.09 CLASS 1 OBESITY DUE TO EXCESS CALORIES WITH SERIOUS COMORBIDITY AND BODY MASS INDEX (BMI) OF 32.0 TO 32.9 IN ADULT: ICD-10-CM

## 2025-08-12 DIAGNOSIS — R87.610 ATYPICAL SQUAMOUS CELLS OF UNDETERMINED SIGNIFICANCE ON CYTOLOGIC SMEAR OF CERVIX (ASC-US): ICD-10-CM

## 2025-08-12 PROBLEM — H61.23 BILATERAL HEARING LOSS DUE TO CERUMEN IMPACTION: Status: RESOLVED | Noted: 2022-03-08 | Resolved: 2025-08-12

## 2025-08-12 PROBLEM — Z00.01 ENCOUNTER FOR WELL ADULT EXAM WITH ABNORMAL FINDINGS: Status: RESOLVED | Noted: 2022-03-07 | Resolved: 2025-08-12

## 2025-08-19 ENCOUNTER — OFFICE VISIT (OUTPATIENT)
Dept: FAMILY MEDICINE CLINIC | Facility: CLINIC | Age: 35
End: 2025-08-19
Payer: COMMERCIAL

## 2025-08-19 VITALS
DIASTOLIC BLOOD PRESSURE: 74 MMHG | BODY MASS INDEX: 32.57 KG/M2 | RESPIRATION RATE: 16 BRPM | TEMPERATURE: 96 F | WEIGHT: 177 LBS | SYSTOLIC BLOOD PRESSURE: 110 MMHG | HEIGHT: 62 IN | HEART RATE: 67 BPM | OXYGEN SATURATION: 99 %

## 2025-08-19 DIAGNOSIS — Z01.419 WOMEN'S ANNUAL ROUTINE GYNECOLOGICAL EXAMINATION: ICD-10-CM

## 2025-08-19 DIAGNOSIS — D64.9 ANEMIA, UNSPECIFIED TYPE: ICD-10-CM

## 2025-08-19 DIAGNOSIS — R87.610 ATYPICAL SQUAMOUS CELLS OF UNDETERMINED SIGNIFICANCE ON CYTOLOGIC SMEAR OF CERVIX (ASC-US): ICD-10-CM

## 2025-08-19 DIAGNOSIS — Z00.00 ENCOUNTER FOR ANNUAL PHYSICAL EXAM: Primary | ICD-10-CM

## 2025-08-19 PROCEDURE — 99395 PREV VISIT EST AGE 18-39: CPT | Performed by: FAMILY MEDICINE

## 2025-08-19 RX ORDER — PNV NO.95/FERROUS FUM/FOLIC AC 28MG-0.8MG
1 TABLET ORAL DAILY
Qty: 90 TABLET | Refills: 3 | Status: SHIPPED | OUTPATIENT
Start: 2025-08-19

## 2025-08-20 LAB
BUN SERPL-MCNC: 11 MG/DL (ref 6–20)
BUN/CREAT SERPL: 14.3 (ref 7–25)
CALCIUM SERPL-MCNC: 9.5 MG/DL (ref 8.6–10.5)
CHLORIDE SERPL-SCNC: 102 MMOL/L (ref 98–107)
CHOLEST SERPL-MCNC: 162 MG/DL (ref 0–200)
CO2 SERPL-SCNC: 24.9 MMOL/L (ref 22–29)
CREAT SERPL-MCNC: 0.77 MG/DL (ref 0.57–1)
EGFRCR SERPLBLD CKD-EPI 2021: 103.3 ML/MIN/1.73
ERYTHROCYTE [DISTWIDTH] IN BLOOD BY AUTOMATED COUNT: 13.9 % (ref 12.3–15.4)
GLUCOSE SERPL-MCNC: 89 MG/DL (ref 65–99)
HCT VFR BLD AUTO: 37.3 % (ref 34–46.6)
HDLC SERPL-MCNC: 46 MG/DL (ref 40–60)
HGB BLD-MCNC: 11.3 G/DL (ref 12–15.9)
LDLC SERPL CALC-MCNC: 100 MG/DL (ref 0–100)
MCH RBC QN AUTO: 24.5 PG (ref 26.6–33)
MCHC RBC AUTO-ENTMCNC: 30.3 G/DL (ref 31.5–35.7)
MCV RBC AUTO: 80.7 FL (ref 79–97)
PLATELET # BLD AUTO: 308 10*3/MM3 (ref 140–450)
POTASSIUM SERPL-SCNC: 4.3 MMOL/L (ref 3.5–5.2)
RBC # BLD AUTO: 4.62 10*6/MM3 (ref 3.77–5.28)
SODIUM SERPL-SCNC: 137 MMOL/L (ref 136–145)
TRIGL SERPL-MCNC: 84 MG/DL (ref 0–150)
VLDLC SERPL CALC-MCNC: 16 MG/DL (ref 5–40)
WBC # BLD AUTO: 4.76 10*3/MM3 (ref 3.4–10.8)

## 2025-08-25 LAB
CYTOLOGIST CVX/VAG CYTO: NORMAL
CYTOLOGY CVX/VAG DOC CYTO: NORMAL
CYTOLOGY CVX/VAG DOC THIN PREP: NORMAL
DX ICD CODE: NORMAL
HPV GENOTYPE REFLEX: NORMAL
HPV I/H RISK 4 DNA CVX QL PROBE+SIG AMP: NEGATIVE
Lab: NORMAL
OTHER STN SPEC: NORMAL
RECOM F/U CVX/VAG CYTO: NORMAL
SERVICE CMNT-IMP: NORMAL
STAT OF ADQ CVX/VAG CYTO-IMP: NORMAL